# Patient Record
Sex: FEMALE | Race: WHITE | NOT HISPANIC OR LATINO
[De-identification: names, ages, dates, MRNs, and addresses within clinical notes are randomized per-mention and may not be internally consistent; named-entity substitution may affect disease eponyms.]

---

## 2017-02-03 ENCOUNTER — LABORATORY RESULT (OUTPATIENT)
Age: 62
End: 2017-02-03

## 2017-02-03 ENCOUNTER — APPOINTMENT (OUTPATIENT)
Dept: INTERNAL MEDICINE | Facility: CLINIC | Age: 62
End: 2017-02-03

## 2017-02-03 VITALS
BODY MASS INDEX: 29.03 KG/M2 | TEMPERATURE: 97.4 F | SYSTOLIC BLOOD PRESSURE: 130 MMHG | RESPIRATION RATE: 14 BRPM | HEIGHT: 67 IN | OXYGEN SATURATION: 98 % | WEIGHT: 185 LBS | DIASTOLIC BLOOD PRESSURE: 70 MMHG | HEART RATE: 84 BPM

## 2017-02-03 DIAGNOSIS — K29.70 GASTRITIS, UNSPECIFIED, W/OUT BLEEDING: ICD-10-CM

## 2017-02-06 LAB
ALBUMIN SERPL ELPH-MCNC: 4 G/DL
ALP BLD-CCNC: 67 U/L
ALT SERPL-CCNC: 15 U/L
ANION GAP SERPL CALC-SCNC: 19 MMOL/L
AST SERPL-CCNC: 18 U/L
BASOPHILS # BLD AUTO: 0.01 K/UL
BASOPHILS NFR BLD AUTO: 0.1 %
BILIRUB SERPL-MCNC: 0.2 MG/DL
BUN SERPL-MCNC: 16 MG/DL
CALCIUM SERPL-MCNC: 9.5 MG/DL
CHLORIDE SERPL-SCNC: 100 MMOL/L
CO2 SERPL-SCNC: 25 MMOL/L
CREAT SERPL-MCNC: 0.82 MG/DL
CRP SERPL-MCNC: 1.56 MG/DL
EOSINOPHIL # BLD AUTO: 0.13 K/UL
EOSINOPHIL NFR BLD AUTO: 1.7 %
FERRITIN SERPL-MCNC: 76.2 NG/ML
GLUCOSE SERPL-MCNC: 95 MG/DL
HCT VFR BLD CALC: 39.6 %
HGB BLD-MCNC: 12.7 G/DL
IMM GRANULOCYTES NFR BLD AUTO: 0.4 %
IRON SATN MFR SERPL: 22 %
IRON SERPL-MCNC: 74 UG/DL
LYMPHOCYTES # BLD AUTO: 2.58 K/UL
LYMPHOCYTES NFR BLD AUTO: 33.2 %
MAN DIFF?: NORMAL
MCHC RBC-ENTMCNC: 27 PG
MCHC RBC-ENTMCNC: 32.1 GM/DL
MCV RBC AUTO: 84.1 FL
MONOCYTES # BLD AUTO: 0.64 K/UL
MONOCYTES NFR BLD AUTO: 8.2 %
NEUTROPHILS # BLD AUTO: 4.39 K/UL
NEUTROPHILS NFR BLD AUTO: 56.4 %
PLATELET # BLD AUTO: 248 K/UL
POTASSIUM SERPL-SCNC: 4.3 MMOL/L
PROT SERPL-MCNC: 7.1 G/DL
RBC # BLD: 4.71 M/UL
RBC # FLD: 13.7 %
SODIUM SERPL-SCNC: 144 MMOL/L
TIBC SERPL-MCNC: 341 UG/DL
UIBC SERPL-MCNC: 267 UG/DL
WBC # FLD AUTO: 7.78 K/UL

## 2017-02-07 ENCOUNTER — RESULT REVIEW (OUTPATIENT)
Age: 62
End: 2017-02-07

## 2017-02-07 ENCOUNTER — APPOINTMENT (OUTPATIENT)
Dept: SURGERY | Facility: CLINIC | Age: 62
End: 2017-02-07

## 2017-02-07 ENCOUNTER — INPATIENT (INPATIENT)
Facility: HOSPITAL | Age: 62
LOS: 0 days | Discharge: HOME | DRG: 354 | End: 2017-02-08
Attending: EMERGENCY MEDICINE | Admitting: EMERGENCY MEDICINE
Payer: COMMERCIAL

## 2017-02-07 ENCOUNTER — TRANSCRIPTION ENCOUNTER (OUTPATIENT)
Age: 62
End: 2017-02-07

## 2017-02-07 VITALS
WEIGHT: 185 LBS | SYSTOLIC BLOOD PRESSURE: 128 MMHG | HEIGHT: 67 IN | BODY MASS INDEX: 29.03 KG/M2 | DIASTOLIC BLOOD PRESSURE: 74 MMHG

## 2017-02-07 VITALS
DIASTOLIC BLOOD PRESSURE: 80 MMHG | TEMPERATURE: 98 F | HEIGHT: 67 IN | HEART RATE: 74 BPM | SYSTOLIC BLOOD PRESSURE: 135 MMHG | WEIGHT: 169.98 LBS | RESPIRATION RATE: 14 BRPM | OXYGEN SATURATION: 100 %

## 2017-02-07 DIAGNOSIS — M79.89 OTHER SPECIFIED SOFT TISSUE DISORDERS: ICD-10-CM

## 2017-02-07 DIAGNOSIS — K43.9 VENTRAL HERNIA WITHOUT OBSTRUCTION OR GANGRENE: ICD-10-CM

## 2017-02-07 DIAGNOSIS — Z98.89 OTHER SPECIFIED POSTPROCEDURAL STATES: Chronic | ICD-10-CM

## 2017-02-07 DIAGNOSIS — L03.311 CELLULITIS OF ABDOMINAL WALL: ICD-10-CM

## 2017-02-07 DIAGNOSIS — R10.13 EPIGASTRIC PAIN: ICD-10-CM

## 2017-02-07 LAB
ALBUMIN SERPL ELPH-MCNC: 3.4 G/DL — SIGNIFICANT CHANGE UP (ref 3.3–5)
ALP SERPL-CCNC: 72 U/L — SIGNIFICANT CHANGE UP (ref 40–120)
ALT FLD-CCNC: 23 U/L — SIGNIFICANT CHANGE UP (ref 12–78)
AMYLASE P1 CFR SERPL: 56 U/L — SIGNIFICANT CHANGE UP (ref 25–115)
ANION GAP SERPL CALC-SCNC: 12 MMOL/L — SIGNIFICANT CHANGE UP (ref 5–17)
APPEARANCE UR: CLEAR — SIGNIFICANT CHANGE UP
APTT BLD: 26.2 SEC — LOW (ref 27.5–37.4)
AST SERPL-CCNC: 20 U/L — SIGNIFICANT CHANGE UP (ref 15–37)
BASOPHILS # BLD AUTO: 0.1 K/UL — SIGNIFICANT CHANGE UP (ref 0–0.2)
BASOPHILS NFR BLD AUTO: 0.9 % — SIGNIFICANT CHANGE UP (ref 0–2)
BILIRUB SERPL-MCNC: 0.3 MG/DL — SIGNIFICANT CHANGE UP (ref 0.2–1.2)
BILIRUB UR-MCNC: ABNORMAL
BUN SERPL-MCNC: 18 MG/DL — SIGNIFICANT CHANGE UP (ref 7–23)
CALCIUM SERPL-MCNC: 8.8 MG/DL — SIGNIFICANT CHANGE UP (ref 8.5–10.1)
CHLORIDE SERPL-SCNC: 103 MMOL/L — SIGNIFICANT CHANGE UP (ref 96–108)
CO2 SERPL-SCNC: 25 MMOL/L — SIGNIFICANT CHANGE UP (ref 22–31)
COLOR SPEC: YELLOW — SIGNIFICANT CHANGE UP
CREAT SERPL-MCNC: 0.89 MG/DL — SIGNIFICANT CHANGE UP (ref 0.5–1.3)
DIFF PNL FLD: ABNORMAL
EOSINOPHIL # BLD AUTO: 0.1 K/UL — SIGNIFICANT CHANGE UP (ref 0–0.5)
EOSINOPHIL NFR BLD AUTO: 1.3 % — SIGNIFICANT CHANGE UP (ref 0–6)
GLUCOSE SERPL-MCNC: 133 MG/DL — HIGH (ref 70–99)
GLUCOSE UR QL: NEGATIVE — SIGNIFICANT CHANGE UP
HCT VFR BLD CALC: 38 % — SIGNIFICANT CHANGE UP (ref 34.5–45)
HGB BLD-MCNC: 12.4 G/DL — SIGNIFICANT CHANGE UP (ref 11.5–15.5)
INR BLD: 1.04 RATIO — SIGNIFICANT CHANGE UP (ref 0.88–1.16)
KETONES UR-MCNC: ABNORMAL
LACTATE SERPL-SCNC: 2.7 MMOL/L — HIGH (ref 0.7–2)
LEUKOCYTE ESTERASE UR-ACNC: ABNORMAL
LIDOCAIN IGE QN: 207 U/L — SIGNIFICANT CHANGE UP (ref 73–393)
LYMPHOCYTES # BLD AUTO: 29 % — SIGNIFICANT CHANGE UP (ref 13–44)
LYMPHOCYTES # BLD AUTO: 3.1 K/UL — SIGNIFICANT CHANGE UP (ref 1–3.3)
MCHC RBC-ENTMCNC: 27.1 PG — SIGNIFICANT CHANGE UP (ref 27–34)
MCHC RBC-ENTMCNC: 32.7 GM/DL — SIGNIFICANT CHANGE UP (ref 32–36)
MCV RBC AUTO: 83 FL — SIGNIFICANT CHANGE UP (ref 80–100)
MONOCYTES # BLD AUTO: 0.5 K/UL — SIGNIFICANT CHANGE UP (ref 0–0.9)
MONOCYTES NFR BLD AUTO: 5 % — SIGNIFICANT CHANGE UP (ref 1–9)
NEUTROPHILS # BLD AUTO: 6.7 K/UL — SIGNIFICANT CHANGE UP (ref 1.8–7.4)
NEUTROPHILS NFR BLD AUTO: 63.8 % — SIGNIFICANT CHANGE UP (ref 43–77)
NITRITE UR-MCNC: NEGATIVE — SIGNIFICANT CHANGE UP
PH UR: 5 — SIGNIFICANT CHANGE UP (ref 4.8–8)
PLATELET # BLD AUTO: 257 K/UL — SIGNIFICANT CHANGE UP (ref 150–400)
POTASSIUM SERPL-MCNC: 4.1 MMOL/L — SIGNIFICANT CHANGE UP (ref 3.5–5.3)
POTASSIUM SERPL-SCNC: 4.1 MMOL/L — SIGNIFICANT CHANGE UP (ref 3.5–5.3)
PROT SERPL-MCNC: 7.7 G/DL — SIGNIFICANT CHANGE UP (ref 6–8.3)
PROT UR-MCNC: 25 MG/DL
PROTHROM AB SERPL-ACNC: 11.5 SEC — SIGNIFICANT CHANGE UP (ref 10–13.1)
RBC # BLD: 4.58 M/UL — SIGNIFICANT CHANGE UP (ref 3.8–5.2)
RBC # FLD: 12.3 % — SIGNIFICANT CHANGE UP (ref 10.3–14.5)
SODIUM SERPL-SCNC: 140 MMOL/L — SIGNIFICANT CHANGE UP (ref 135–145)
SP GR SPEC: 1.02 — SIGNIFICANT CHANGE UP (ref 1.01–1.02)
UROBILINOGEN FLD QL: NEGATIVE — SIGNIFICANT CHANGE UP
WBC # BLD: 10.6 K/UL — HIGH (ref 3.8–10.5)
WBC # FLD AUTO: 10.6 K/UL — HIGH (ref 3.8–10.5)

## 2017-02-07 PROCEDURE — 99285 EMERGENCY DEPT VISIT HI MDM: CPT

## 2017-02-07 PROCEDURE — 74177 CT ABD & PELVIS W/CONTRAST: CPT | Mod: 26

## 2017-02-07 PROCEDURE — 99223 1ST HOSP IP/OBS HIGH 75: CPT | Mod: 57

## 2017-02-07 PROCEDURE — 99222 1ST HOSP IP/OBS MODERATE 55: CPT | Mod: AI,GC

## 2017-02-07 RX ORDER — SODIUM CHLORIDE 9 MG/ML
3 INJECTION INTRAMUSCULAR; INTRAVENOUS; SUBCUTANEOUS ONCE
Qty: 0 | Refills: 0 | Status: COMPLETED | OUTPATIENT
Start: 2017-02-07 | End: 2017-02-07

## 2017-02-07 RX ORDER — TAMOXIFEN CITRATE 20 MG/1
20 TABLET, FILM COATED ORAL DAILY
Qty: 0 | Refills: 0 | Status: DISCONTINUED | OUTPATIENT
Start: 2017-02-07 | End: 2017-02-08

## 2017-02-07 RX ORDER — IOHEXOL 300 MG/ML
30 INJECTION, SOLUTION INTRAVENOUS ONCE
Qty: 0 | Refills: 0 | Status: COMPLETED | OUTPATIENT
Start: 2017-02-07 | End: 2017-02-07

## 2017-02-07 RX ORDER — CEFAZOLIN SODIUM 1 G
2000 VIAL (EA) INJECTION ONCE
Qty: 0 | Refills: 0 | Status: DISCONTINUED | OUTPATIENT
Start: 2017-02-07 | End: 2017-02-08

## 2017-02-07 RX ORDER — SODIUM CHLORIDE 9 MG/ML
1000 INJECTION INTRAMUSCULAR; INTRAVENOUS; SUBCUTANEOUS ONCE
Qty: 0 | Refills: 0 | Status: DISCONTINUED | OUTPATIENT
Start: 2017-02-07 | End: 2017-02-08

## 2017-02-07 RX ORDER — SODIUM CHLORIDE 9 MG/ML
1000 INJECTION INTRAMUSCULAR; INTRAVENOUS; SUBCUTANEOUS ONCE
Qty: 0 | Refills: 0 | Status: COMPLETED | OUTPATIENT
Start: 2017-02-07 | End: 2017-02-07

## 2017-02-07 RX ORDER — CEFAZOLIN SODIUM 1 G
1000 VIAL (EA) INJECTION ONCE
Qty: 0 | Refills: 0 | Status: COMPLETED | OUTPATIENT
Start: 2017-02-07 | End: 2017-02-07

## 2017-02-07 RX ADMIN — SODIUM CHLORIDE 3 MILLILITER(S): 9 INJECTION INTRAMUSCULAR; INTRAVENOUS; SUBCUTANEOUS at 17:59

## 2017-02-07 RX ADMIN — IOHEXOL 30 MILLILITER(S): 300 INJECTION, SOLUTION INTRAVENOUS at 17:56

## 2017-02-07 RX ADMIN — Medication 100 MILLIGRAM(S): at 21:27

## 2017-02-07 RX ADMIN — SODIUM CHLORIDE 1000 MILLILITER(S): 9 INJECTION INTRAMUSCULAR; INTRAVENOUS; SUBCUTANEOUS at 17:55

## 2017-02-07 NOTE — ED PROVIDER NOTE - CHPI ED SYMPTOMS NEG
no diarrhea/no fever/no burning urination/no dysuria/no hematuria/no blood in stool/no vomiting/no nausea/no chills

## 2017-02-07 NOTE — H&P ADULT. - ASSESSMENT
61 year old woman with incarcerated ventral hernia. 60 yo F with PMH of Breast Cancer s/p radiation therapy x 8 weeks, R lumpectomy in 2014 admitted with Incarcerated Ventral Hernia.

## 2017-02-07 NOTE — H&P ADULT. - PMH
Breast cancer    Ventral hernia without obstruction or gangrene Breast cancer  s/p 8 weeks radiation therapy, s/p R lumpectomy in 2014  Ventral hernia without obstruction or gangrene

## 2017-02-07 NOTE — ED PROVIDER NOTE - CONDUCTED A DETAILED DISCUSSION WITH PATIENT AND/OR GUARDIAN REGARDING, MDM
radiology results/lab results/return to ED if symptoms worsen, persist or questions arise/need to admit

## 2017-02-07 NOTE — H&P ADULT. - OTHER CARE PROVIDERS
Joshua (OB/GYN), Maria Fernanda (Breast Surgery), Aubrey (Plastic Surgery) Joshua (OB/GYN), Maria Fernanda (Breast Surgery), Aubrey (Plastic Surgery), Monica (Surgery)

## 2017-02-07 NOTE — ED PROVIDER NOTE - ABDOMINAL EXAM
soft/tender.../HERNIATION/herniation noted to right mid upper abdominal region TTP hard with overlying erythema noted

## 2017-02-07 NOTE — ED PROVIDER NOTE - MEDICAL DECISION MAKING DETAILS
cbc, cmp, amylase, lipase, lactate, blood culture, UA, urine culture, INR, type and screen, CT abd/pelvis

## 2017-02-07 NOTE — H&P ADULT. - HISTORY OF PRESENT ILLNESS
60 yo F with PMH of Breast Cancer s/p radiation therapy x 8 weeks, R lumpectomy in 2014, who was in her usual state of good health, presented to the ED with abdominal pain for the past 1.5 weeks. States that pain is new onset, and first felt the pain after pulling a heavy rug (though she is not sure that was the cause). Since then, 60 yo F with PMH of Breast Cancer s/p radiation therapy x 8 weeks, R lumpectomy in 2014, who was in her usual state of good health, presented to the ED via Dr Anderson's office with history of abdominal pain for the past 1.5 weeks. States that pain is new onset, and first felt the pain after pulling a heavy rug (though she is not sure that was the cause).   She was seen by PMD and sent for surgical evaluation to r/o hernia.  Pt seen by Dr Anderson who referred to ED as the patient presented with erythematous mass in mid epigastrium, painful and fluctuant.  CT abdomen recommended to r/o Incarcerated ventral hernia vs Abscess. 60 yo F with PMH of Breast Cancer s/p radiation therapy x 8 weeks, R lumpectomy in 2014, who was in her usual state of good health, presented to the ED via Dr Anderson's office with history of abdominal pain for the past 1.5 weeks. States that pain is new onset, and first felt the pain after pulling a heavy rug (though she is not sure that was the cause). Also noted a bulge in her upper to mid abdomen, midline region, at the site of the pain, as well as overlying redness at the site. Pain is exacerbated by any touch or pressure, causing 10/10 pain that is sharp, aching, and non radiating. No relieving factors. Denies fever, chills, n/v/d/c, chest pain, palpitations, SOB cough, increased frequency, urgency, or pain with urination. Pt was seen by PMD and sent for surgical evaluation to r/o hernia.  Pt seen by Dr Anderson who referred her to ED as the patient presented with erythematous mass in mid epigastrium, painful and fluctuant. CT abdomen recommended to r/o Incarcerated ventral hernia vs Abscess.    In ED:  VS: Hemodynamically stable.  Labs: CBC showed elevated WBC at 10.6. Lactate elevated at 2.7.   UA: trace LE, Nitrite negative, 25 protein, small bilirubin, small ketone, small blood, 3-5 RBCs, few bacteria, 3-5 Hyaline casts.  Imaging: CT Abdomen and Pelvis w/ Oral and IV Contrast: Upper abdominal wall ventral hernia contains inflamed omental fat and   fluid, suggestive of strangulation with fat necrosis. No bowel is involved. Negative for obstruction.  Tx: Cefazolin 1 gram IV x1, NS 1L Bolus x2.  Dispo: Admitted to [facility] for Incarcerated Ventral Hernia.

## 2017-02-07 NOTE — ED ADULT NURSE NOTE - OBJECTIVE STATEMENT
61 year old female complains of abdominal pain and "hernia". Patient reports an enlarged bulge in her upper abdominal area for the past week and a half. Patient reports pain to the region only when touched or with pressure. Patient has a history of breast cancer and is currently taking PO chemo. Pt was seen by surgeon today and sent to the ED for abdominal workup.

## 2017-02-07 NOTE — ED PROVIDER NOTE - CARE PLAN
Principal Discharge DX:	Ventral hernia  Instructions for follow-up, activity and diet:	admit  Secondary Diagnosis:	Fat necrosis  Secondary Diagnosis:	Cellulitis

## 2017-02-07 NOTE — H&P ADULT. - LAB RESULTS AND INTERPRETATION
CBC showed elevated WBC at 10.6. Lactate elevated at 2.7.  UA: trace LE, Nitrite negative, 25 protein, small bilirubin, small ketone, small blood, 3-5 RBCs, few bacteria, 3-5 Hyaline casts.

## 2017-02-07 NOTE — ED ADULT NURSE REASSESSMENT NOTE - NS ED NURSE REASSESS COMMENT FT1
Patient received from Rehana CASTAÑEDA. Patient is A&Ox4 , laying on the stretcher calmly , denies any pain or discomfort. Patient went to radiology. Will continue to monitor.
CT oral contrast administered to patient. Patient prepping at this time, instructed to drink over 1 hour before CT planned in 2 hours. Will continue to monitor. Safety maintained.

## 2017-02-07 NOTE — ED PROVIDER NOTE - OBJECTIVE STATEMENT
Pt is a 60 yo female who presents to the ED with a cc of abdominal pain.  Pt reports that approx 1 1/2 weeks ago she noted a bulge in her upper abdominal region. Pt reports that it appears to have enlarged to her.   She also reports overlying erythema to the site for the last several days.  Denies previous abdominal surgeries.  Does report that just prior to noting the bulge in the abdomen she had lifted a heavy rug.  Denies pain to the site at rest but reports that when it is touched or any pressure is placed on the site she experiences extreme pain.  She also reports that for the last 3 days she has experienced constipation.  She did have a loose BM last night and again this morning.  Denies fever, chills, N/V/D, CP, SOB, ext numbness or weakness.  Pt does have a history of breast cancer and is currently on oral therapy. Pt was seen in the surgeon's office today and sent to the ED for further work up

## 2017-02-07 NOTE — H&P ADULT. - RADIOLOGY RESULTS AND INTERPRETATION
Imaging: CT Abdomen and Pelvis w/ Oral and IV Contrast: Upper abdominal wall ventral hernia contains inflamed omental fat and   fluid, suggestive of strangulation with fat necrosis. No bowel is involved. Negative for obstruction.

## 2017-02-07 NOTE — ED PROVIDER NOTE - FAMILY HISTORY
Mother  Still living? Unknown  Family history of breast cancer in mother, Age at diagnosis: Age Unknown

## 2017-02-08 ENCOUNTER — TRANSCRIPTION ENCOUNTER (OUTPATIENT)
Age: 62
End: 2017-02-08

## 2017-02-08 VITALS
SYSTOLIC BLOOD PRESSURE: 100 MMHG | OXYGEN SATURATION: 96 % | HEART RATE: 96 BPM | TEMPERATURE: 97 F | DIASTOLIC BLOOD PRESSURE: 68 MMHG | RESPIRATION RATE: 16 BRPM

## 2017-02-08 LAB
ANION GAP SERPL CALC-SCNC: 9 MMOL/L — SIGNIFICANT CHANGE UP (ref 5–17)
BASOPHILS # BLD AUTO: 0 K/UL — SIGNIFICANT CHANGE UP (ref 0–0.2)
BASOPHILS NFR BLD AUTO: 0.2 % — SIGNIFICANT CHANGE UP (ref 0–2)
BUN SERPL-MCNC: 14 MG/DL — SIGNIFICANT CHANGE UP (ref 7–23)
CALCIUM SERPL-MCNC: 8.3 MG/DL — LOW (ref 8.5–10.1)
CHLORIDE SERPL-SCNC: 106 MMOL/L — SIGNIFICANT CHANGE UP (ref 96–108)
CO2 SERPL-SCNC: 26 MMOL/L — SIGNIFICANT CHANGE UP (ref 22–31)
CREAT SERPL-MCNC: 0.7 MG/DL — SIGNIFICANT CHANGE UP (ref 0.5–1.3)
CULTURE RESULTS: SIGNIFICANT CHANGE UP
EOSINOPHIL # BLD AUTO: 0 K/UL — SIGNIFICANT CHANGE UP (ref 0–0.5)
EOSINOPHIL NFR BLD AUTO: 0.1 % — SIGNIFICANT CHANGE UP (ref 0–6)
GLUCOSE SERPL-MCNC: 141 MG/DL — HIGH (ref 70–99)
HCT VFR BLD CALC: 35.4 % — SIGNIFICANT CHANGE UP (ref 34.5–45)
HGB BLD-MCNC: 11.6 G/DL — SIGNIFICANT CHANGE UP (ref 11.5–15.5)
LACTATE SERPL-SCNC: 1 MMOL/L — SIGNIFICANT CHANGE UP (ref 0.7–2)
LYMPHOCYTES # BLD AUTO: 1 K/UL — SIGNIFICANT CHANGE UP (ref 1–3.3)
LYMPHOCYTES # BLD AUTO: 10.1 % — LOW (ref 13–44)
MCHC RBC-ENTMCNC: 27.2 PG — SIGNIFICANT CHANGE UP (ref 27–34)
MCHC RBC-ENTMCNC: 32.9 GM/DL — SIGNIFICANT CHANGE UP (ref 32–36)
MCV RBC AUTO: 82.9 FL — SIGNIFICANT CHANGE UP (ref 80–100)
MONOCYTES # BLD AUTO: 0.2 K/UL — SIGNIFICANT CHANGE UP (ref 0–0.9)
MONOCYTES NFR BLD AUTO: 1.5 % — SIGNIFICANT CHANGE UP (ref 1–9)
NEUTROPHILS # BLD AUTO: 8.9 K/UL — HIGH (ref 1.8–7.4)
NEUTROPHILS NFR BLD AUTO: 88.1 % — HIGH (ref 43–77)
PLATELET # BLD AUTO: 231 K/UL — SIGNIFICANT CHANGE UP (ref 150–400)
POTASSIUM SERPL-MCNC: 4.6 MMOL/L — SIGNIFICANT CHANGE UP (ref 3.5–5.3)
POTASSIUM SERPL-SCNC: 4.6 MMOL/L — SIGNIFICANT CHANGE UP (ref 3.5–5.3)
RBC # BLD: 4.27 M/UL — SIGNIFICANT CHANGE UP (ref 3.8–5.2)
RBC # FLD: 12.6 % — SIGNIFICANT CHANGE UP (ref 10.3–14.5)
SODIUM SERPL-SCNC: 141 MMOL/L — SIGNIFICANT CHANGE UP (ref 135–145)
SPECIMEN SOURCE: SIGNIFICANT CHANGE UP
WBC # BLD: 10.1 K/UL — SIGNIFICANT CHANGE UP (ref 3.8–10.5)
WBC # FLD AUTO: 10.1 K/UL — SIGNIFICANT CHANGE UP (ref 3.8–10.5)

## 2017-02-08 PROCEDURE — 82150 ASSAY OF AMYLASE: CPT

## 2017-02-08 PROCEDURE — 85730 THROMBOPLASTIN TIME PARTIAL: CPT

## 2017-02-08 PROCEDURE — 96365 THER/PROPH/DIAG IV INF INIT: CPT

## 2017-02-08 PROCEDURE — 85610 PROTHROMBIN TIME: CPT

## 2017-02-08 PROCEDURE — 86900 BLOOD TYPING SEROLOGIC ABO: CPT

## 2017-02-08 PROCEDURE — 96375 TX/PRO/DX INJ NEW DRUG ADDON: CPT

## 2017-02-08 PROCEDURE — 88302 TISSUE EXAM BY PATHOLOGIST: CPT

## 2017-02-08 PROCEDURE — 83605 ASSAY OF LACTIC ACID: CPT

## 2017-02-08 PROCEDURE — 83690 ASSAY OF LIPASE: CPT

## 2017-02-08 PROCEDURE — 99285 EMERGENCY DEPT VISIT HI MDM: CPT | Mod: 25

## 2017-02-08 PROCEDURE — 49561: CPT

## 2017-02-08 PROCEDURE — 86850 RBC ANTIBODY SCREEN: CPT

## 2017-02-08 PROCEDURE — 99239 HOSP IP/OBS DSCHRG MGMT >30: CPT

## 2017-02-08 PROCEDURE — 80053 COMPREHEN METABOLIC PANEL: CPT

## 2017-02-08 PROCEDURE — 87086 URINE CULTURE/COLONY COUNT: CPT

## 2017-02-08 PROCEDURE — 74177 CT ABD & PELVIS W/CONTRAST: CPT

## 2017-02-08 PROCEDURE — 49561: CPT | Mod: AS

## 2017-02-08 PROCEDURE — 81001 URINALYSIS AUTO W/SCOPE: CPT

## 2017-02-08 PROCEDURE — C1781: CPT

## 2017-02-08 PROCEDURE — 86901 BLOOD TYPING SEROLOGIC RH(D): CPT

## 2017-02-08 PROCEDURE — 88302 TISSUE EXAM BY PATHOLOGIST: CPT | Mod: 26

## 2017-02-08 PROCEDURE — 85027 COMPLETE CBC AUTOMATED: CPT

## 2017-02-08 PROCEDURE — 80048 BASIC METABOLIC PNL TOTAL CA: CPT

## 2017-02-08 RX ORDER — HYDROMORPHONE HYDROCHLORIDE 2 MG/ML
0.5 INJECTION INTRAMUSCULAR; INTRAVENOUS; SUBCUTANEOUS
Qty: 0 | Refills: 0 | Status: DISCONTINUED | OUTPATIENT
Start: 2017-02-08 | End: 2017-02-08

## 2017-02-08 RX ORDER — SODIUM CHLORIDE 9 MG/ML
1000 INJECTION, SOLUTION INTRAVENOUS
Qty: 0 | Refills: 0 | Status: DISCONTINUED | OUTPATIENT
Start: 2017-02-08 | End: 2017-02-08

## 2017-02-08 RX ORDER — ONDANSETRON 8 MG/1
4 TABLET, FILM COATED ORAL ONCE
Qty: 0 | Refills: 0 | Status: DISCONTINUED | OUTPATIENT
Start: 2017-02-08 | End: 2017-02-08

## 2017-02-08 RX ORDER — TAMOXIFEN CITRATE 20 MG/1
1 TABLET, FILM COATED ORAL
Qty: 0 | Refills: 0 | COMMUNITY
Start: 2017-02-08

## 2017-02-08 RX ORDER — ENOXAPARIN SODIUM 100 MG/ML
40 INJECTION SUBCUTANEOUS EVERY 24 HOURS
Qty: 0 | Refills: 0 | Status: DISCONTINUED | OUTPATIENT
Start: 2017-02-09 | End: 2017-02-08

## 2017-02-08 RX ORDER — OXYCODONE HYDROCHLORIDE 5 MG/1
1 TABLET ORAL
Qty: 28 | Refills: 0 | OUTPATIENT
Start: 2017-02-08 | End: 2017-02-15

## 2017-02-08 RX ORDER — KETOROLAC TROMETHAMINE 30 MG/ML
30 SYRINGE (ML) INJECTION ONCE
Qty: 0 | Refills: 0 | Status: DISCONTINUED | OUTPATIENT
Start: 2017-02-08 | End: 2017-02-08

## 2017-02-08 RX ORDER — TAMOXIFEN CITRATE 20 MG/1
1 TABLET, FILM COATED ORAL
Qty: 0 | Refills: 0 | DISCHARGE
Start: 2017-02-08

## 2017-02-08 RX ORDER — CEPHALEXIN 500 MG
1 CAPSULE ORAL
Qty: 28 | Refills: 0 | OUTPATIENT
Start: 2017-02-08 | End: 2017-02-15

## 2017-02-08 RX ORDER — METOPROLOL TARTRATE 50 MG
2 TABLET ORAL
Qty: 0 | Refills: 0 | Status: DISCONTINUED | OUTPATIENT
Start: 2017-02-08 | End: 2017-02-08

## 2017-02-08 RX ORDER — TAMOXIFEN CITRATE 20 MG/1
20 TABLET, FILM COATED ORAL DAILY
Qty: 0 | Refills: 0 | Status: DISCONTINUED | OUTPATIENT
Start: 2017-02-08 | End: 2017-02-08

## 2017-02-08 RX ORDER — CEFAZOLIN SODIUM 1 G
1000 VIAL (EA) INJECTION EVERY 8 HOURS
Qty: 0 | Refills: 0 | Status: DISCONTINUED | OUTPATIENT
Start: 2017-02-08 | End: 2017-02-08

## 2017-02-08 RX ORDER — HYDROMORPHONE HYDROCHLORIDE 2 MG/ML
1 INJECTION INTRAMUSCULAR; INTRAVENOUS; SUBCUTANEOUS EVERY 4 HOURS
Qty: 0 | Refills: 0 | Status: DISCONTINUED | OUTPATIENT
Start: 2017-02-08 | End: 2017-02-08

## 2017-02-08 RX ADMIN — Medication 30 MILLILITER(S): at 13:19

## 2017-02-08 RX ADMIN — Medication 30 MILLIGRAM(S): at 02:01

## 2017-02-08 RX ADMIN — Medication 100 MILLIGRAM(S): at 08:27

## 2017-02-08 RX ADMIN — HYDROMORPHONE HYDROCHLORIDE 0.5 MILLIGRAM(S): 2 INJECTION INTRAMUSCULAR; INTRAVENOUS; SUBCUTANEOUS at 02:22

## 2017-02-08 RX ADMIN — HYDROMORPHONE HYDROCHLORIDE 0.5 MILLIGRAM(S): 2 INJECTION INTRAMUSCULAR; INTRAVENOUS; SUBCUTANEOUS at 01:56

## 2017-02-08 RX ADMIN — HYDROMORPHONE HYDROCHLORIDE 0.5 MILLIGRAM(S): 2 INJECTION INTRAMUSCULAR; INTRAVENOUS; SUBCUTANEOUS at 02:26

## 2017-02-08 RX ADMIN — HYDROMORPHONE HYDROCHLORIDE 0.5 MILLIGRAM(S): 2 INJECTION INTRAMUSCULAR; INTRAVENOUS; SUBCUTANEOUS at 01:59

## 2017-02-08 RX ADMIN — HYDROMORPHONE HYDROCHLORIDE 0.5 MILLIGRAM(S): 2 INJECTION INTRAMUSCULAR; INTRAVENOUS; SUBCUTANEOUS at 02:11

## 2017-02-08 RX ADMIN — HYDROMORPHONE HYDROCHLORIDE 0.5 MILLIGRAM(S): 2 INJECTION INTRAMUSCULAR; INTRAVENOUS; SUBCUTANEOUS at 01:46

## 2017-02-08 RX ADMIN — HYDROMORPHONE HYDROCHLORIDE 0.5 MILLIGRAM(S): 2 INJECTION INTRAMUSCULAR; INTRAVENOUS; SUBCUTANEOUS at 02:14

## 2017-02-08 RX ADMIN — Medication 30 MILLIGRAM(S): at 02:16

## 2017-02-08 RX ADMIN — SODIUM CHLORIDE 50 MILLILITER(S): 9 INJECTION, SOLUTION INTRAVENOUS at 01:59

## 2017-02-08 RX ADMIN — SODIUM CHLORIDE 50 MILLILITER(S): 9 INJECTION, SOLUTION INTRAVENOUS at 01:49

## 2017-02-08 NOTE — DISCHARGE NOTE ADULT - HOSPITAL COURSE
60 yo F with PMH of Breast Cancer s/p radiation therapy x 8 weeks, R lumpectomy in 2014, who was in her usual state of good health, presented to the ED via Dr Anderson's office with history of abdominal pain for the past 1.5 weeks. States that pain is new onset, and first felt the pain after pulling a heavy rug (though she is not sure that was the cause). Also noted a bulge in her upper to mid abdomen, midline region, at the site of the pain, as well as overlying redness at the site. Pain is exacerbated by any touch or pressure, causing 10/10 pain that is sharp, aching, and non radiating. No relieving factors. Denies fever, chills, n/v/d/c, chest pain, palpitations, SOB cough, increased frequency, urgency, or pain with urination. Pt was seen by PMD and sent for surgical evaluation to r/o hernia.  Pt seen by Dr Anderson who referred her to ED as the patient presented with erythematous mass in mid epigastrium, painful and fluctuant. CT abdomen recommended to r/o Incarcerated ventral hernia vs Abscess .admitted abd wall cellulitis surrounding incarcerated ventral hernia  / went or for emergent surgery by dr anderson  / post op hosp course stable seen by surgery pa / pt can be dc home on po abx and pain meds   if tolerate diet with fu  appointment Tuesday with  dr cabrera office / pmd dr rutledge notified dc plan.

## 2017-02-08 NOTE — PATIENT PROFILE ADULT. - PMH
Breast cancer  s/p 8 weeks radiation therapy, s/p R lumpectomy in 2014  Ventral hernia without obstruction or gangrene

## 2017-02-08 NOTE — DISCHARGE NOTE ADULT - ADDITIONAL INSTRUCTIONS
fu surgery dr ramirez in 1wk next Tuesday . fu pmd dr rutledge in 1wk . compressive dressing bio occlusive for  48 hr  than can remove and keep sterile strip on and can shower .

## 2017-02-08 NOTE — DISCHARGE NOTE ADULT - MEDICATION SUMMARY - MEDICATIONS TO TAKE
I will START or STAY ON the medications listed below when I get home from the hospital:    acetaminophen-oxyCODONE 325 mg-5 mg oral tablet  -- 1 tab(s) by mouth every 6 hours, As Needed / post hernia repair MDD:4  -- Caution federal law prohibits the transfer of this drug to any person other  than the person for whom it was prescribed.  May cause drowsiness.  Alcohol may intensify this effect.  Use care when operating dangerous machinery.  This prescription cannot be refilled.  This product contains acetaminophen.  Do not use  with any other product containing acetaminophen to prevent possible liver damage.  Using more of this medication than prescribed may cause serious breathing problems.    -- Indication: For hernia repair    tamoxifen 20 mg oral tablet  -- 1 tab(s) by mouth once a day  -- Indication: For hx breast ca     cephalexin 500 mg oral tablet  -- 1 tab(s) by mouth 4 times a day  -- Finish all this medication unless otherwise directed by prescriber.    -- Indication: For Cellulitis/ abd wall

## 2017-02-08 NOTE — DISCHARGE NOTE ADULT - CARE PLAN
Principal Discharge DX:	Ventral hernia  Goal:	post hernia repair  Instructions for follow-up, activity and diet:	fu Tuesday to dr ramirez  for fu  Secondary Diagnosis:	Cellulitis of abdominal wall  Instructions for follow-up, activity and diet:	on po abx  Secondary Diagnosis:	Breast cancer  Instructions for follow-up, activity and diet:	cont meds

## 2017-02-08 NOTE — DISCHARGE NOTE ADULT - CARE PROVIDERS DIRECT ADDRESSES
[email or web address]

## 2017-02-08 NOTE — DISCHARGE NOTE ADULT - CARE PROVIDER_API CALL
Mainor Arnett), Internal Medicine  [address] [place] [address]  Phone: [phone]  Fax: [phone]    Ashwin Anderson), Surgery  [address] [place] [address]  Phone: [phone]  Fax: [phone]

## 2017-02-10 DIAGNOSIS — Z92.3 PERSONAL HISTORY OF IRRADIATION: ICD-10-CM

## 2017-02-10 DIAGNOSIS — L03.311 CELLULITIS OF ABDOMINAL WALL: ICD-10-CM

## 2017-02-10 DIAGNOSIS — Z92.21 PERSONAL HISTORY OF ANTINEOPLASTIC CHEMOTHERAPY: ICD-10-CM

## 2017-02-10 DIAGNOSIS — K43.6 OTHER AND UNSPECIFIED VENTRAL HERNIA WITH OBSTRUCTION, WITHOUT GANGRENE: ICD-10-CM

## 2017-02-10 DIAGNOSIS — Z28.21 IMMUNIZATION NOT CARRIED OUT BECAUSE OF PATIENT REFUSAL: ICD-10-CM

## 2017-02-10 DIAGNOSIS — M79.89 OTHER SPECIFIED SOFT TISSUE DISORDERS: ICD-10-CM

## 2017-02-10 DIAGNOSIS — K65.4 SCLEROSING MESENTERITIS: ICD-10-CM

## 2017-02-10 DIAGNOSIS — K59.00 CONSTIPATION, UNSPECIFIED: ICD-10-CM

## 2017-02-10 DIAGNOSIS — R19.06 EPIGASTRIC SWELLING, MASS OR LUMP: ICD-10-CM

## 2017-02-10 DIAGNOSIS — C50.919 MALIGNANT NEOPLASM OF UNSPECIFIED SITE OF UNSPECIFIED FEMALE BREAST: ICD-10-CM

## 2017-02-14 ENCOUNTER — APPOINTMENT (OUTPATIENT)
Dept: SURGERY | Facility: CLINIC | Age: 62
End: 2017-02-14

## 2017-02-14 VITALS
DIASTOLIC BLOOD PRESSURE: 79 MMHG | BODY MASS INDEX: 29.03 KG/M2 | SYSTOLIC BLOOD PRESSURE: 117 MMHG | HEIGHT: 67 IN | WEIGHT: 185 LBS

## 2017-02-14 DIAGNOSIS — K46.0 UNSPECIFIED ABDOMINAL HERNIA WITH OBSTRUCTION, W/OUT GANGRENE: ICD-10-CM

## 2017-03-02 ENCOUNTER — APPOINTMENT (OUTPATIENT)
Dept: INTERNAL MEDICINE | Facility: CLINIC | Age: 62
End: 2017-03-02

## 2017-03-02 ENCOUNTER — RX RENEWAL (OUTPATIENT)
Age: 62
End: 2017-03-02

## 2017-03-02 VITALS
WEIGHT: 181 LBS | HEART RATE: 78 BPM | SYSTOLIC BLOOD PRESSURE: 118 MMHG | RESPIRATION RATE: 14 BRPM | BODY MASS INDEX: 28.41 KG/M2 | DIASTOLIC BLOOD PRESSURE: 68 MMHG | TEMPERATURE: 98 F | HEIGHT: 67 IN | OXYGEN SATURATION: 98 %

## 2017-03-29 ENCOUNTER — CHART COPY (OUTPATIENT)
Age: 62
End: 2017-03-29

## 2017-06-01 ENCOUNTER — APPOINTMENT (OUTPATIENT)
Dept: INTERNAL MEDICINE | Facility: CLINIC | Age: 62
End: 2017-06-01

## 2017-06-01 VITALS
SYSTOLIC BLOOD PRESSURE: 110 MMHG | DIASTOLIC BLOOD PRESSURE: 70 MMHG | BODY MASS INDEX: 27.94 KG/M2 | RESPIRATION RATE: 14 BRPM | OXYGEN SATURATION: 98 % | HEART RATE: 76 BPM | WEIGHT: 178 LBS | HEIGHT: 67 IN | TEMPERATURE: 98.2 F

## 2017-06-01 RX ORDER — AMOXICILLIN AND CLAVULANATE POTASSIUM 875; 125 MG/1; MG/1
875-125 TABLET, COATED ORAL
Qty: 20 | Refills: 0 | Status: DISCONTINUED | COMMUNITY
Start: 2017-01-11 | End: 2017-06-01

## 2017-06-01 RX ORDER — TOBRAMYCIN AND DEXAMETHASONE 3; 1 MG/ML; MG/ML
0.3-0.1 SUSPENSION/ DROPS OPHTHALMIC
Qty: 5 | Refills: 0 | Status: DISCONTINUED | COMMUNITY
Start: 2017-01-18 | End: 2017-06-01

## 2017-06-01 RX ORDER — AMOXICILLIN 875 MG/1
875 TABLET, FILM COATED ORAL
Qty: 20 | Refills: 0 | Status: DISCONTINUED | COMMUNITY
Start: 2017-01-26 | End: 2017-06-01

## 2017-06-01 RX ORDER — AZITHROMYCIN 250 MG/1
250 TABLET, FILM COATED ORAL
Qty: 1 | Refills: 0 | Status: DISCONTINUED | COMMUNITY
Start: 2017-02-03 | End: 2017-06-01

## 2017-06-01 RX ORDER — CEFADROXIL 500 MG/1
500 CAPSULE ORAL
Qty: 10 | Refills: 0 | Status: DISCONTINUED | COMMUNITY
Start: 2016-08-24 | End: 2017-06-01

## 2017-06-01 RX ORDER — OMEPRAZOLE 40 MG/1
40 CAPSULE, DELAYED RELEASE ORAL
Qty: 30 | Refills: 0 | Status: DISCONTINUED | COMMUNITY
Start: 2017-02-03 | End: 2017-06-01

## 2017-06-01 RX ORDER — BACITRACIN 500 [USP'U]/G
500 OINTMENT OPHTHALMIC
Qty: 4 | Refills: 0 | Status: DISCONTINUED | COMMUNITY
Start: 2017-01-17 | End: 2017-06-01

## 2017-06-01 RX ORDER — FLUTICASONE PROPIONATE 50 UG/1
50 SPRAY, METERED NASAL DAILY
Qty: 1 | Refills: 0 | Status: DISCONTINUED | COMMUNITY
Start: 2017-02-03 | End: 2017-06-01

## 2017-07-25 ENCOUNTER — APPOINTMENT (OUTPATIENT)
Dept: GYNECOLOGIC ONCOLOGY | Facility: CLINIC | Age: 62
End: 2017-07-25

## 2017-07-25 VITALS — WEIGHT: 173 LBS | HEIGHT: 67 IN | BODY MASS INDEX: 27.15 KG/M2

## 2017-07-25 DIAGNOSIS — N88.2 STRICTURE AND STENOSIS OF CERVIX UTERI: ICD-10-CM

## 2017-07-27 LAB — CORE LAB BIOPSY: NORMAL

## 2017-07-30 PROBLEM — N88.2 CERVICAL STENOSIS (UTERINE CERVIX): Status: ACTIVE | Noted: 2017-07-30

## 2017-08-14 ENCOUNTER — OTHER (OUTPATIENT)
Age: 62
End: 2017-08-14

## 2017-08-17 ENCOUNTER — APPOINTMENT (OUTPATIENT)
Dept: DERMATOLOGY | Facility: CLINIC | Age: 62
End: 2017-08-17

## 2017-08-23 ENCOUNTER — OUTPATIENT (OUTPATIENT)
Dept: OUTPATIENT SERVICES | Facility: HOSPITAL | Age: 62
LOS: 1 days | End: 2017-08-23
Payer: COMMERCIAL

## 2017-08-23 VITALS
WEIGHT: 166.89 LBS | SYSTOLIC BLOOD PRESSURE: 119 MMHG | TEMPERATURE: 98 F | RESPIRATION RATE: 12 BRPM | DIASTOLIC BLOOD PRESSURE: 79 MMHG | HEIGHT: 67.5 IN | HEART RATE: 77 BPM | OXYGEN SATURATION: 100 %

## 2017-08-23 DIAGNOSIS — R93.8 ABNORMAL FINDINGS ON DIAGNOSTIC IMAGING OF OTHER SPECIFIED BODY STRUCTURES: ICD-10-CM

## 2017-08-23 DIAGNOSIS — Z98.89 OTHER SPECIFIED POSTPROCEDURAL STATES: Chronic | ICD-10-CM

## 2017-08-23 DIAGNOSIS — N88.2 STRICTURE AND STENOSIS OF CERVIX UTERI: ICD-10-CM

## 2017-08-23 DIAGNOSIS — Z01.818 ENCOUNTER FOR OTHER PREPROCEDURAL EXAMINATION: ICD-10-CM

## 2017-08-23 PROCEDURE — 85027 COMPLETE CBC AUTOMATED: CPT

## 2017-08-23 PROCEDURE — G0463: CPT

## 2017-08-23 RX ORDER — ACETAMINOPHEN 500 MG
975 TABLET ORAL ONCE
Qty: 0 | Refills: 0 | Status: COMPLETED | OUTPATIENT
Start: 2017-08-30 | End: 2017-08-30

## 2017-08-23 RX ORDER — CELECOXIB 200 MG/1
200 CAPSULE ORAL ONCE
Qty: 0 | Refills: 0 | Status: COMPLETED | OUTPATIENT
Start: 2017-08-30 | End: 2017-08-30

## 2017-08-23 RX ORDER — LIDOCAINE HCL 20 MG/ML
0.2 VIAL (ML) INJECTION ONCE
Qty: 0 | Refills: 0 | Status: DISCONTINUED | OUTPATIENT
Start: 2017-08-30 | End: 2017-09-14

## 2017-08-23 RX ORDER — SODIUM CHLORIDE 9 MG/ML
3 INJECTION INTRAMUSCULAR; INTRAVENOUS; SUBCUTANEOUS EVERY 8 HOURS
Qty: 0 | Refills: 0 | Status: DISCONTINUED | OUTPATIENT
Start: 2017-08-30 | End: 2017-09-14

## 2017-08-23 NOTE — H&P PST ADULT - NSANTHOSAYNRD_GEN_A_CORE
No. MERARI screening performed.  STOP BANG Legend: 0-2 = LOW Risk; 3-4 = INTERMEDIATE Risk; 5-8 = HIGH Risk

## 2017-08-23 NOTE — H&P PST ADULT - ATTENDING COMMENTS
Seen in SDA. She reports no changes to her condition. Planned procedure discussed. Consent reviewed. All Q/A.

## 2017-08-24 ENCOUNTER — NON-APPOINTMENT (OUTPATIENT)
Age: 62
End: 2017-08-24

## 2017-08-24 ENCOUNTER — APPOINTMENT (OUTPATIENT)
Dept: INTERNAL MEDICINE | Facility: CLINIC | Age: 62
End: 2017-08-24
Payer: MEDICAID

## 2017-08-24 VITALS
DIASTOLIC BLOOD PRESSURE: 80 MMHG | OXYGEN SATURATION: 97 % | TEMPERATURE: 97.9 F | WEIGHT: 168 LBS | SYSTOLIC BLOOD PRESSURE: 130 MMHG | HEART RATE: 75 BPM | RESPIRATION RATE: 14 BRPM | HEIGHT: 67 IN | BODY MASS INDEX: 26.37 KG/M2

## 2017-08-24 PROCEDURE — 93000 ELECTROCARDIOGRAM COMPLETE: CPT

## 2017-08-24 PROCEDURE — 99214 OFFICE O/P EST MOD 30 MIN: CPT | Mod: 25

## 2017-08-30 ENCOUNTER — APPOINTMENT (OUTPATIENT)
Dept: GYNECOLOGIC ONCOLOGY | Facility: HOSPITAL | Age: 62
End: 2017-08-30

## 2017-08-30 ENCOUNTER — APPOINTMENT (OUTPATIENT)
Dept: ULTRASOUND IMAGING | Facility: HOSPITAL | Age: 62
End: 2017-08-30

## 2017-08-30 ENCOUNTER — RESULT REVIEW (OUTPATIENT)
Age: 62
End: 2017-08-30

## 2017-08-30 ENCOUNTER — OUTPATIENT (OUTPATIENT)
Dept: OUTPATIENT SERVICES | Facility: HOSPITAL | Age: 62
LOS: 1 days | End: 2017-08-30
Payer: COMMERCIAL

## 2017-08-30 VITALS — WEIGHT: 165.35 LBS | HEIGHT: 67 IN

## 2017-08-30 VITALS
OXYGEN SATURATION: 100 % | RESPIRATION RATE: 18 BRPM | HEART RATE: 94 BPM | DIASTOLIC BLOOD PRESSURE: 84 MMHG | SYSTOLIC BLOOD PRESSURE: 141 MMHG | TEMPERATURE: 98 F

## 2017-08-30 DIAGNOSIS — R93.8 ABNORMAL FINDINGS ON DIAGNOSTIC IMAGING OF OTHER SPECIFIED BODY STRUCTURES: ICD-10-CM

## 2017-08-30 DIAGNOSIS — Z98.89 OTHER SPECIFIED POSTPROCEDURAL STATES: Chronic | ICD-10-CM

## 2017-08-30 DIAGNOSIS — N88.2 STRICTURE AND STENOSIS OF CERVIX UTERI: ICD-10-CM

## 2017-08-30 PROCEDURE — 58558 HYSTEROSCOPY BIOPSY: CPT

## 2017-08-30 PROCEDURE — 88305 TISSUE EXAM BY PATHOLOGIST: CPT

## 2017-08-30 PROCEDURE — 76998 US GUIDE INTRAOP: CPT

## 2017-08-30 PROCEDURE — 88305 TISSUE EXAM BY PATHOLOGIST: CPT | Mod: 26

## 2017-08-30 RX ORDER — OXYCODONE HYDROCHLORIDE 5 MG/1
5 TABLET ORAL ONCE
Qty: 0 | Refills: 0 | Status: DISCONTINUED | OUTPATIENT
Start: 2017-08-30 | End: 2017-08-30

## 2017-08-30 RX ORDER — OXYCODONE HYDROCHLORIDE 5 MG/1
10 TABLET ORAL ONCE
Qty: 0 | Refills: 0 | Status: DISCONTINUED | OUTPATIENT
Start: 2017-08-30 | End: 2017-08-30

## 2017-08-30 RX ORDER — CELECOXIB 200 MG/1
200 CAPSULE ORAL ONCE
Qty: 0 | Refills: 0 | Status: DISCONTINUED | OUTPATIENT
Start: 2017-08-30 | End: 2017-09-14

## 2017-08-30 RX ORDER — ONDANSETRON 8 MG/1
4 TABLET, FILM COATED ORAL ONCE
Qty: 0 | Refills: 0 | Status: DISCONTINUED | OUTPATIENT
Start: 2017-08-30 | End: 2017-09-14

## 2017-08-30 RX ORDER — SODIUM CHLORIDE 9 MG/ML
1000 INJECTION INTRAMUSCULAR; INTRAVENOUS; SUBCUTANEOUS
Qty: 0 | Refills: 0 | Status: DISCONTINUED | OUTPATIENT
Start: 2017-08-30 | End: 2017-09-14

## 2017-08-30 RX ADMIN — Medication 975 MILLIGRAM(S): at 13:19

## 2017-08-30 RX ADMIN — CELECOXIB 200 MILLIGRAM(S): 200 CAPSULE ORAL at 13:19

## 2017-08-30 NOTE — ASU DISCHARGE PLAN (ADULT/PEDIATRIC). - MEDICATION SUMMARY - MEDICATIONS TO TAKE
I will START or STAY ON the medications listed below when I get home from the hospital:    Motrin 600 mg oral tablet  -- 1 tab(s) by mouth every 6 hours  -- Indication: For Cramping/Pain    Tylenol 500 mg oral tablet  -- 2 tab(s) by mouth every 6 hours  -- Indication: For Cramping/Pain    tamoxifen 20 mg oral tablet  -- 1 tab(s) by mouth once a day  -- Indication: For Home medication    famotidine 20 mg oral tablet  --  by mouth once a day pm and am of surgery  -- Indication: For Home medication

## 2017-08-30 NOTE — BRIEF OPERATIVE NOTE - OPERATION/FINDINGS
EUA with enlarged uterus to 12+ weeks and large posterior myoma. Nl vulvar, vagina, cervix. No palpable adnexal masses. Sounded to 10 cm with sono guidance throughout. H/S, bland EM with apparent posterior and anterior myomas; ?septum at fundus. Nl EC canal.

## 2017-08-30 NOTE — ASU DISCHARGE PLAN (ADULT/PEDIATRIC). - NOTIFY
Bleeding that does not stop/GYN Fever>100.4/Inability to Tolerate Liquids or Foods/Pain not relieved by Medications/Unable to Urinate

## 2017-08-30 NOTE — PRE-ANESTHESIA EVALUATION ADULT - NSANTHADDINFOFT_GEN_ALL_CORE
Denies CP, SOB, cough, fever, acid reflux with italian and greek, greasy food. ok today never when NPO

## 2017-09-01 ENCOUNTER — TRANSCRIPTION ENCOUNTER (OUTPATIENT)
Age: 62
End: 2017-09-01

## 2017-09-01 LAB — SURGICAL PATHOLOGY STUDY: SIGNIFICANT CHANGE UP

## 2017-09-05 ENCOUNTER — CHART COPY (OUTPATIENT)
Age: 62
End: 2017-09-05

## 2017-10-16 ENCOUNTER — APPOINTMENT (OUTPATIENT)
Dept: GYNECOLOGIC ONCOLOGY | Facility: CLINIC | Age: 62
End: 2017-10-16
Payer: MEDICAID

## 2017-10-16 VITALS
BODY MASS INDEX: 24.33 KG/M2 | HEIGHT: 67 IN | SYSTOLIC BLOOD PRESSURE: 125 MMHG | DIASTOLIC BLOOD PRESSURE: 70 MMHG | WEIGHT: 155 LBS

## 2017-10-16 PROCEDURE — 99213 OFFICE O/P EST LOW 20 MIN: CPT

## 2017-11-29 ENCOUNTER — APPOINTMENT (OUTPATIENT)
Dept: ORTHOPEDIC SURGERY | Facility: CLINIC | Age: 62
End: 2017-11-29

## 2018-01-22 ENCOUNTER — APPOINTMENT (OUTPATIENT)
Dept: GYNECOLOGIC ONCOLOGY | Facility: CLINIC | Age: 63
End: 2018-01-22
Payer: MEDICAID

## 2018-01-22 VITALS
BODY MASS INDEX: 25.9 KG/M2 | SYSTOLIC BLOOD PRESSURE: 128 MMHG | HEIGHT: 67 IN | DIASTOLIC BLOOD PRESSURE: 72 MMHG | WEIGHT: 165 LBS

## 2018-01-22 PROCEDURE — 99213 OFFICE O/P EST LOW 20 MIN: CPT

## 2018-02-28 ENCOUNTER — FORM ENCOUNTER (OUTPATIENT)
Age: 63
End: 2018-02-28

## 2018-03-01 ENCOUNTER — APPOINTMENT (OUTPATIENT)
Dept: ULTRASOUND IMAGING | Facility: CLINIC | Age: 63
End: 2018-03-01
Payer: MEDICAID

## 2018-03-01 ENCOUNTER — OUTPATIENT (OUTPATIENT)
Dept: OUTPATIENT SERVICES | Facility: HOSPITAL | Age: 63
LOS: 1 days | End: 2018-03-01
Payer: COMMERCIAL

## 2018-03-01 DIAGNOSIS — Z98.89 OTHER SPECIFIED POSTPROCEDURAL STATES: Chronic | ICD-10-CM

## 2018-03-01 DIAGNOSIS — Z00.8 ENCOUNTER FOR OTHER GENERAL EXAMINATION: ICD-10-CM

## 2018-03-01 PROCEDURE — 76830 TRANSVAGINAL US NON-OB: CPT

## 2018-03-01 PROCEDURE — 76830 TRANSVAGINAL US NON-OB: CPT | Mod: 26

## 2018-05-24 ENCOUNTER — NON-APPOINTMENT (OUTPATIENT)
Age: 63
End: 2018-05-24

## 2018-05-24 ENCOUNTER — APPOINTMENT (OUTPATIENT)
Dept: INTERNAL MEDICINE | Facility: CLINIC | Age: 63
End: 2018-05-24
Payer: MEDICAID

## 2018-05-24 VITALS
WEIGHT: 150 LBS | SYSTOLIC BLOOD PRESSURE: 124 MMHG | RESPIRATION RATE: 14 BRPM | OXYGEN SATURATION: 98 % | BODY MASS INDEX: 23.54 KG/M2 | TEMPERATURE: 97.5 F | HEIGHT: 67 IN | DIASTOLIC BLOOD PRESSURE: 76 MMHG | HEART RATE: 76 BPM

## 2018-05-24 DIAGNOSIS — H26.9 UNSPECIFIED CATARACT: ICD-10-CM

## 2018-05-24 PROCEDURE — 99214 OFFICE O/P EST MOD 30 MIN: CPT | Mod: 25

## 2018-05-24 PROCEDURE — 93000 ELECTROCARDIOGRAM COMPLETE: CPT

## 2018-05-24 NOTE — HISTORY OF PRESENT ILLNESS
[Coronary Artery Disease] : no coronary artery disease [Diabetes] : no diabetes [Sleep Apnea] : no sleep apnea [COPD] : no COPD [Previous Adverse Anesthesia Reaction] : no previous adverse anesthesia reaction [FreeTextEntry1] : Right cataract surgery [FreeTextEntry2] : 6/19/18 [FreeTextEntry3] : Dr. Capone [FreeTextEntry4] : Here for presurgical evaluation prior to Right cataract surgery on 6/19/18.  Pt denies chest pain, dyspnea, or lightheadedness.\par Pt does complain about chronic right hip pain for 6 months.

## 2018-05-24 NOTE — PLAN
[FreeTextEntry1] : Recommend routine perioperative hemodynamic monitoring\par Pt will see an orthopedist regarding Right hip pain

## 2018-05-24 NOTE — ASSESSMENT
[FreeTextEntry4] : EKG NSR\par No medical contraindications to proceeding with the planned cataract surgery\par

## 2018-06-01 ENCOUNTER — OTHER (OUTPATIENT)
Age: 63
End: 2018-06-01

## 2018-06-07 ENCOUNTER — APPOINTMENT (OUTPATIENT)
Dept: INTERNAL MEDICINE | Facility: CLINIC | Age: 63
End: 2018-06-07
Payer: MEDICAID

## 2018-06-07 ENCOUNTER — OUTPATIENT (OUTPATIENT)
Dept: OUTPATIENT SERVICES | Facility: HOSPITAL | Age: 63
LOS: 1 days | End: 2018-06-07
Payer: COMMERCIAL

## 2018-06-07 VITALS
HEIGHT: 67 IN | BODY MASS INDEX: 27.62 KG/M2 | OXYGEN SATURATION: 93 % | RESPIRATION RATE: 14 BRPM | DIASTOLIC BLOOD PRESSURE: 70 MMHG | TEMPERATURE: 98 F | HEART RATE: 79 BPM | SYSTOLIC BLOOD PRESSURE: 110 MMHG | WEIGHT: 176 LBS

## 2018-06-07 VITALS
HEIGHT: 67 IN | OXYGEN SATURATION: 100 % | HEART RATE: 88 BPM | DIASTOLIC BLOOD PRESSURE: 75 MMHG | SYSTOLIC BLOOD PRESSURE: 113 MMHG | TEMPERATURE: 98 F | WEIGHT: 175.93 LBS | RESPIRATION RATE: 18 BRPM

## 2018-06-07 DIAGNOSIS — R93.8 ABNORMAL FINDINGS ON DIAGNOSTIC IMAGING OF OTHER SPECIFIED BODY STRUCTURES: ICD-10-CM

## 2018-06-07 DIAGNOSIS — Z01.818 ENCOUNTER FOR OTHER PREPROCEDURAL EXAMINATION: ICD-10-CM

## 2018-06-07 DIAGNOSIS — C50.919 MALIGNANT NEOPLASM OF UNSPECIFIED SITE OF UNSPECIFIED FEMALE BREAST: ICD-10-CM

## 2018-06-07 DIAGNOSIS — Z98.89 OTHER SPECIFIED POSTPROCEDURAL STATES: Chronic | ICD-10-CM

## 2018-06-07 DIAGNOSIS — D25.9 LEIOMYOMA OF UTERUS, UNSPECIFIED: ICD-10-CM

## 2018-06-07 DIAGNOSIS — N88.2 STRICTURE AND STENOSIS OF CERVIX UTERI: ICD-10-CM

## 2018-06-07 DIAGNOSIS — Z98.890 OTHER SPECIFIED POSTPROCEDURAL STATES: Chronic | ICD-10-CM

## 2018-06-07 LAB
HCT VFR BLD CALC: 39.1 % — SIGNIFICANT CHANGE UP (ref 34.5–45)
HGB BLD-MCNC: 12.2 G/DL — SIGNIFICANT CHANGE UP (ref 11.5–15.5)
MCHC RBC-ENTMCNC: 26.1 PG — LOW (ref 27–34)
MCHC RBC-ENTMCNC: 31.2 GM/DL — LOW (ref 32–36)
MCV RBC AUTO: 83.7 FL — SIGNIFICANT CHANGE UP (ref 80–100)
PLATELET # BLD AUTO: 270 K/UL — SIGNIFICANT CHANGE UP (ref 150–400)
RBC # BLD: 4.67 M/UL — SIGNIFICANT CHANGE UP (ref 3.8–5.2)
RBC # FLD: 13.7 % — SIGNIFICANT CHANGE UP (ref 10.3–14.5)
WBC # BLD: 8.68 K/UL — SIGNIFICANT CHANGE UP (ref 3.8–10.5)
WBC # FLD AUTO: 8.68 K/UL — SIGNIFICANT CHANGE UP (ref 3.8–10.5)

## 2018-06-07 PROCEDURE — 99214 OFFICE O/P EST MOD 30 MIN: CPT

## 2018-06-07 PROCEDURE — 85027 COMPLETE CBC AUTOMATED: CPT

## 2018-06-07 PROCEDURE — G0463: CPT

## 2018-06-07 RX ORDER — SODIUM CHLORIDE 9 MG/ML
3 INJECTION INTRAMUSCULAR; INTRAVENOUS; SUBCUTANEOUS EVERY 8 HOURS
Qty: 0 | Refills: 0 | Status: DISCONTINUED | OUTPATIENT
Start: 2018-06-12 | End: 2018-06-27

## 2018-06-07 RX ORDER — IBUPROFEN 200 MG
1 TABLET ORAL
Qty: 0 | Refills: 0 | COMMUNITY

## 2018-06-07 RX ORDER — ACETAMINOPHEN 500 MG
1000 TABLET ORAL ONCE
Qty: 0 | Refills: 0 | Status: COMPLETED | OUTPATIENT
Start: 2018-06-12 | End: 2018-06-12

## 2018-06-07 RX ORDER — LIDOCAINE HCL 20 MG/ML
0.2 VIAL (ML) INJECTION ONCE
Qty: 0 | Refills: 0 | Status: DISCONTINUED | OUTPATIENT
Start: 2018-06-12 | End: 2018-06-27

## 2018-06-07 RX ORDER — ACETAMINOPHEN 500 MG
2 TABLET ORAL
Qty: 0 | Refills: 0 | COMMUNITY

## 2018-06-07 RX ORDER — FAMOTIDINE 10 MG/ML
0 INJECTION INTRAVENOUS
Qty: 0 | Refills: 0 | COMMUNITY

## 2018-06-07 NOTE — H&P PST ADULT - ASSESSMENT
leiomyoma of uterus  stricture and stenosis of cervix uteri  abnormal findings on diagnostic imaging of other specified body structure

## 2018-06-07 NOTE — H&P PST ADULT - HISTORY OF PRESENT ILLNESS
This is a 62 y/o  female a routine sonogram revealed fibroids, she presents today for D&C, hysteroscopy with sono guidance

## 2018-06-07 NOTE — H&P PST ADULT - BACK
Report called to Ang CUELLO at [facility]. ANEL called for transport, spoke with Joao    No deformity or limitation of movement

## 2018-06-08 NOTE — ADDENDUM
[FreeTextEntry1] : Presurgical labs are within acceptable limits\par There are no medical contraindications to proceeding with the planned surgery.\par I recommend routine perioperative hemodynamic monitoring

## 2018-06-08 NOTE — HISTORY OF PRESENT ILLNESS
[( Patient denies any chest pain, claudication, dyspnea on exertion, orthopnea, palpitations or syncope )] : Patient denies any chest pain, claudication, dyspnea on exertion, orthopnea, palpitations or syncope. [Coronary Artery Disease] : no coronary artery disease [Diabetes] : no diabetes [Sleep Apnea] : no sleep apnea [COPD] : no COPD [Previous Adverse Anesthesia Reaction] : no previous adverse anesthesia reaction [FreeTextEntry1] : Diagnostic Hysteroscopy, D&C [FreeTextEntry2] : 6/12/18 [FreeTextEntry3] : Dr. Ian Patel [FreeTextEntry4] : Here for presurgical evaluation. Feeling well. Denies chest pain, dyspnea, lightheadedness.

## 2018-06-11 ENCOUNTER — TRANSCRIPTION ENCOUNTER (OUTPATIENT)
Age: 63
End: 2018-06-11

## 2018-06-11 RX ORDER — OXYCODONE HYDROCHLORIDE 5 MG/1
5 TABLET ORAL ONCE
Qty: 0 | Refills: 0 | Status: DISCONTINUED | OUTPATIENT
Start: 2018-06-12 | End: 2018-06-12

## 2018-06-11 RX ORDER — CELECOXIB 200 MG/1
200 CAPSULE ORAL ONCE
Qty: 0 | Refills: 0 | Status: DISCONTINUED | OUTPATIENT
Start: 2018-06-12 | End: 2018-06-27

## 2018-06-11 RX ORDER — ONDANSETRON 8 MG/1
4 TABLET, FILM COATED ORAL ONCE
Qty: 0 | Refills: 0 | Status: DISCONTINUED | OUTPATIENT
Start: 2018-06-12 | End: 2018-06-27

## 2018-06-11 RX ORDER — SODIUM CHLORIDE 9 MG/ML
1000 INJECTION, SOLUTION INTRAVENOUS
Qty: 0 | Refills: 0 | Status: DISCONTINUED | OUTPATIENT
Start: 2018-06-12 | End: 2018-06-27

## 2018-06-12 ENCOUNTER — APPOINTMENT (OUTPATIENT)
Dept: GYNECOLOGIC ONCOLOGY | Facility: HOSPITAL | Age: 63
End: 2018-06-12

## 2018-06-12 ENCOUNTER — OUTPATIENT (OUTPATIENT)
Dept: OUTPATIENT SERVICES | Facility: HOSPITAL | Age: 63
LOS: 1 days | End: 2018-06-12
Payer: COMMERCIAL

## 2018-06-12 ENCOUNTER — APPOINTMENT (OUTPATIENT)
Dept: ULTRASOUND IMAGING | Facility: HOSPITAL | Age: 63
End: 2018-06-12

## 2018-06-12 ENCOUNTER — RESULT REVIEW (OUTPATIENT)
Age: 63
End: 2018-06-12

## 2018-06-12 VITALS
DIASTOLIC BLOOD PRESSURE: 75 MMHG | SYSTOLIC BLOOD PRESSURE: 120 MMHG | WEIGHT: 175.93 LBS | HEIGHT: 67 IN | HEART RATE: 83 BPM | RESPIRATION RATE: 18 BRPM | TEMPERATURE: 98 F | OXYGEN SATURATION: 100 %

## 2018-06-12 VITALS
HEART RATE: 80 BPM | OXYGEN SATURATION: 98 % | SYSTOLIC BLOOD PRESSURE: 132 MMHG | RESPIRATION RATE: 15 BRPM | DIASTOLIC BLOOD PRESSURE: 76 MMHG

## 2018-06-12 DIAGNOSIS — Z98.890 OTHER SPECIFIED POSTPROCEDURAL STATES: Chronic | ICD-10-CM

## 2018-06-12 DIAGNOSIS — R93.8 ABNORMAL FINDINGS ON DIAGNOSTIC IMAGING OF OTHER SPECIFIED BODY STRUCTURES: ICD-10-CM

## 2018-06-12 DIAGNOSIS — Z98.89 OTHER SPECIFIED POSTPROCEDURAL STATES: Chronic | ICD-10-CM

## 2018-06-12 DIAGNOSIS — Z01.818 ENCOUNTER FOR OTHER PREPROCEDURAL EXAMINATION: ICD-10-CM

## 2018-06-12 DIAGNOSIS — D25.9 LEIOMYOMA OF UTERUS, UNSPECIFIED: ICD-10-CM

## 2018-06-12 DIAGNOSIS — N88.2 STRICTURE AND STENOSIS OF CERVIX UTERI: ICD-10-CM

## 2018-06-12 PROCEDURE — 88305 TISSUE EXAM BY PATHOLOGIST: CPT | Mod: 26

## 2018-06-12 PROCEDURE — 58558 HYSTEROSCOPY BIOPSY: CPT

## 2018-06-12 PROCEDURE — 88305 TISSUE EXAM BY PATHOLOGIST: CPT

## 2018-06-12 PROCEDURE — 58555 HYSTEROSCOPY DX SEP PROC: CPT

## 2018-06-12 RX ORDER — CELECOXIB 200 MG/1
200 CAPSULE ORAL ONCE
Qty: 0 | Refills: 0 | Status: COMPLETED | OUTPATIENT
Start: 2018-06-12 | End: 2018-06-12

## 2018-06-12 RX ADMIN — Medication 1000 MILLIGRAM(S): at 10:40

## 2018-06-12 RX ADMIN — CELECOXIB 200 MILLIGRAM(S): 200 CAPSULE ORAL at 10:41

## 2018-06-12 NOTE — ASU DISCHARGE PLAN (ADULT/PEDIATRIC). - MEDICATION SUMMARY - MEDICATIONS TO TAKE
I will START or STAY ON the medications listed below when I get home from the hospital:    tamoxifen 20 mg oral tablet  -- 1 tab(s) by mouth once a day (at bedtime)  -- Indication: For home med

## 2018-06-12 NOTE — ASU DISCHARGE PLAN (ADULT/PEDIATRIC). - NOTIFY
GYN Fever>100.4/Inability to Tolerate Liquids or Foods/Bleeding that does not stop/Pain not relieved by Medications Pain not relieved by Medications/Unable to Urinate/GYN Fever>100.4/Inability to Tolerate Liquids or Foods/Persistent Nausea and Vomiting/Bleeding that does not stop

## 2018-06-12 NOTE — BRIEF OPERATIVE NOTE - OPERATION/FINDINGS
Normal vulva, vagina, pm, cervix. Uterus was palpably enlarged to ~14 weeks. There were no palpable adnexal masses.     H/S: Multiple apparent submucous myoma. Visualized EM otherwise appeared without lesions but cavity distorted with myomas. Possible synechia seen.     EM appear thin on sono compared with preop-according to the sono tech.

## 2018-06-12 NOTE — BRIEF OPERATIVE NOTE - PROCEDURE
<<-----Click on this checkbox to enter Procedure Hysteroscopy with dilation and curettage of uterus  06/12/2018    Active  AMENZIN

## 2018-06-13 LAB — SURGICAL PATHOLOGY STUDY: SIGNIFICANT CHANGE UP

## 2018-06-20 ENCOUNTER — RX RENEWAL (OUTPATIENT)
Age: 63
End: 2018-06-20

## 2018-07-09 ENCOUNTER — APPOINTMENT (OUTPATIENT)
Dept: GYNECOLOGIC ONCOLOGY | Facility: CLINIC | Age: 63
End: 2018-07-09
Payer: MEDICAID

## 2018-07-09 PROCEDURE — 99212 OFFICE O/P EST SF 10 MIN: CPT

## 2018-07-30 PROBLEM — K43.9 VENTRAL HERNIA WITHOUT OBSTRUCTION OR GANGRENE: Chronic | Status: ACTIVE | Noted: 2017-02-07

## 2018-08-22 ENCOUNTER — APPOINTMENT (OUTPATIENT)
Dept: ULTRASOUND IMAGING | Facility: CLINIC | Age: 63
End: 2018-08-22
Payer: MEDICAID

## 2018-08-22 ENCOUNTER — OUTPATIENT (OUTPATIENT)
Dept: OUTPATIENT SERVICES | Facility: HOSPITAL | Age: 63
LOS: 1 days | End: 2018-08-22
Payer: COMMERCIAL

## 2018-08-22 ENCOUNTER — APPOINTMENT (OUTPATIENT)
Dept: MAMMOGRAPHY | Facility: CLINIC | Age: 63
End: 2018-08-22
Payer: MEDICAID

## 2018-08-22 DIAGNOSIS — Z00.8 ENCOUNTER FOR OTHER GENERAL EXAMINATION: ICD-10-CM

## 2018-08-22 DIAGNOSIS — Z98.890 OTHER SPECIFIED POSTPROCEDURAL STATES: Chronic | ICD-10-CM

## 2018-08-22 DIAGNOSIS — Z98.89 OTHER SPECIFIED POSTPROCEDURAL STATES: Chronic | ICD-10-CM

## 2018-08-22 PROCEDURE — 77066 DX MAMMO INCL CAD BI: CPT | Mod: 26

## 2018-08-22 PROCEDURE — 77066 DX MAMMO INCL CAD BI: CPT

## 2018-08-22 PROCEDURE — G0279: CPT | Mod: 26

## 2018-08-22 PROCEDURE — 76641 ULTRASOUND BREAST COMPLETE: CPT

## 2018-08-22 PROCEDURE — 76641 ULTRASOUND BREAST COMPLETE: CPT | Mod: 26,50

## 2018-08-22 PROCEDURE — G0279: CPT

## 2018-09-09 ENCOUNTER — RX RENEWAL (OUTPATIENT)
Age: 63
End: 2018-09-09

## 2018-09-12 NOTE — PROVIDER CONTACT NOTE (CRITICAL VALUE NOTIFICATION) - ACTION/TREATMENT ORDERED:
ED MD aware. IV fluids infusing. CT Abdomen ordered. Will reassess.
Pt will ambulate x 200 feet independently in 2-3 sessions

## 2018-10-02 ENCOUNTER — OUTPATIENT (OUTPATIENT)
Dept: OUTPATIENT SERVICES | Facility: HOSPITAL | Age: 63
LOS: 1 days | Discharge: HOME | End: 2018-10-02
Payer: MEDICAID

## 2018-10-02 DIAGNOSIS — C50.919 MALIGNANT NEOPLASM OF UNSPECIFIED SITE OF UNSPECIFIED FEMALE BREAST: ICD-10-CM

## 2018-10-02 DIAGNOSIS — Z98.89 OTHER SPECIFIED POSTPROCEDURAL STATES: Chronic | ICD-10-CM

## 2018-10-02 DIAGNOSIS — Z98.890 OTHER SPECIFIED POSTPROCEDURAL STATES: Chronic | ICD-10-CM

## 2018-10-08 PROBLEM — K44.9 HIATAL HERNIA: Status: ACTIVE | Noted: 2018-10-08

## 2018-10-11 ENCOUNTER — RESULT REVIEW (OUTPATIENT)
Age: 63
End: 2018-10-11

## 2018-10-11 ENCOUNTER — APPOINTMENT (OUTPATIENT)
Dept: HEMATOLOGY ONCOLOGY | Facility: CLINIC | Age: 63
End: 2018-10-11
Payer: MEDICAID

## 2018-10-11 VITALS
DIASTOLIC BLOOD PRESSURE: 81 MMHG | OXYGEN SATURATION: 99 % | RESPIRATION RATE: 18 BRPM | WEIGHT: 174.17 LBS | HEART RATE: 79 BPM | BODY MASS INDEX: 27.34 KG/M2 | SYSTOLIC BLOOD PRESSURE: 132 MMHG | TEMPERATURE: 98.2 F | HEIGHT: 66.93 IN

## 2018-10-11 DIAGNOSIS — K44.9 DIAPHRAGMATIC HERNIA W/OUT OBSTRUCTION OR GANGRENE: ICD-10-CM

## 2018-10-11 LAB
BASOPHILS # BLD AUTO: 0.1 K/UL — SIGNIFICANT CHANGE UP (ref 0–0.2)
BASOPHILS NFR BLD AUTO: 0.7 % — SIGNIFICANT CHANGE UP (ref 0–2)
EOSINOPHIL # BLD AUTO: 0.2 K/UL — SIGNIFICANT CHANGE UP (ref 0–0.5)
EOSINOPHIL NFR BLD AUTO: 2 % — SIGNIFICANT CHANGE UP (ref 0–6)
HCT VFR BLD CALC: 40.6 % — SIGNIFICANT CHANGE UP (ref 34.5–45)
HGB BLD-MCNC: 13.5 G/DL — SIGNIFICANT CHANGE UP (ref 11.5–15.5)
LYMPHOCYTES # BLD AUTO: 2.8 K/UL — SIGNIFICANT CHANGE UP (ref 1–3.3)
LYMPHOCYTES # BLD AUTO: 34.1 % — SIGNIFICANT CHANGE UP (ref 13–44)
MCHC RBC-ENTMCNC: 27.5 PG — SIGNIFICANT CHANGE UP (ref 27–34)
MCHC RBC-ENTMCNC: 33.2 G/DL — SIGNIFICANT CHANGE UP (ref 32–36)
MCV RBC AUTO: 82.9 FL — SIGNIFICANT CHANGE UP (ref 80–100)
MONOCYTES # BLD AUTO: 0.5 K/UL — SIGNIFICANT CHANGE UP (ref 0–0.9)
MONOCYTES NFR BLD AUTO: 6.3 % — SIGNIFICANT CHANGE UP (ref 2–14)
NEUTROPHILS # BLD AUTO: 4.7 K/UL — SIGNIFICANT CHANGE UP (ref 1.8–7.4)
NEUTROPHILS NFR BLD AUTO: 56.8 % — SIGNIFICANT CHANGE UP (ref 43–77)
PLATELET # BLD AUTO: 260 K/UL — SIGNIFICANT CHANGE UP (ref 150–400)
RBC # BLD: 4.89 M/UL — SIGNIFICANT CHANGE UP (ref 3.8–5.2)
RBC # FLD: 12.4 % — SIGNIFICANT CHANGE UP (ref 10.3–14.5)
WBC # BLD: 8.3 K/UL — SIGNIFICANT CHANGE UP (ref 3.8–10.5)
WBC # FLD AUTO: 8.3 K/UL — SIGNIFICANT CHANGE UP (ref 3.8–10.5)

## 2018-10-11 PROCEDURE — 88321 CONSLTJ&REPRT SLD PREP ELSWR: CPT

## 2018-10-11 PROCEDURE — 99205 OFFICE O/P NEW HI 60 MIN: CPT

## 2018-10-15 ENCOUNTER — RESULT REVIEW (OUTPATIENT)
Age: 63
End: 2018-10-15

## 2018-10-15 PROCEDURE — 88321 CONSLTJ&REPRT SLD PREP ELSWR: CPT

## 2018-10-17 ENCOUNTER — MESSAGE (OUTPATIENT)
Age: 63
End: 2018-10-17

## 2018-10-17 LAB
ALBUMIN SERPL ELPH-MCNC: 4.7 G/DL
ALP BLD-CCNC: 67 U/L
ALT SERPL-CCNC: 15 U/L
ANION GAP SERPL CALC-SCNC: 12 MMOL/L
AST SERPL-CCNC: 19 U/L
BILIRUB SERPL-MCNC: 0.2 MG/DL
BUN SERPL-MCNC: 20 MG/DL
CALCIUM SERPL-MCNC: 9.5 MG/DL
CHLORIDE SERPL-SCNC: 104 MMOL/L
CO2 SERPL-SCNC: 23 MMOL/L
CREAT SERPL-MCNC: 0.76 MG/DL
GLUCOSE SERPL-MCNC: 92 MG/DL
POTASSIUM SERPL-SCNC: 4.8 MMOL/L
PROT SERPL-MCNC: 7.4 G/DL
SODIUM SERPL-SCNC: 139 MMOL/L

## 2018-12-10 ENCOUNTER — APPOINTMENT (OUTPATIENT)
Dept: SURGERY | Facility: CLINIC | Age: 63
End: 2018-12-10
Payer: MEDICAID

## 2018-12-10 VITALS
BODY MASS INDEX: 26.37 KG/M2 | TEMPERATURE: 98.2 F | SYSTOLIC BLOOD PRESSURE: 122 MMHG | HEIGHT: 67.5 IN | DIASTOLIC BLOOD PRESSURE: 72 MMHG | HEART RATE: 88 BPM | WEIGHT: 170 LBS

## 2018-12-10 PROCEDURE — 99243 OFF/OP CNSLTJ NEW/EST LOW 30: CPT | Mod: 25

## 2018-12-10 PROCEDURE — 45300 PROCTOSIGMOIDOSCOPY DX: CPT

## 2019-01-01 NOTE — PATIENT PROFILE ADULT. - VISION (WITH CORRECTIVE LENSES IF THE PATIENT USUALLY WEARS THEM):
Normal vision: sees adequately in most situations; can see medication labels, newsprint
Right Foot/Right Hand

## 2019-02-15 ENCOUNTER — APPOINTMENT (OUTPATIENT)
Dept: INTERNAL MEDICINE | Facility: CLINIC | Age: 64
End: 2019-02-15

## 2019-04-09 ENCOUNTER — OUTPATIENT (OUTPATIENT)
Dept: OUTPATIENT SERVICES | Facility: HOSPITAL | Age: 64
LOS: 1 days | Discharge: HOME | End: 2019-04-09

## 2019-04-09 DIAGNOSIS — Z98.890 OTHER SPECIFIED POSTPROCEDURAL STATES: Chronic | ICD-10-CM

## 2019-04-09 DIAGNOSIS — Z98.89 OTHER SPECIFIED POSTPROCEDURAL STATES: Chronic | ICD-10-CM

## 2019-04-09 DIAGNOSIS — C50.919 MALIGNANT NEOPLASM OF UNSPECIFIED SITE OF UNSPECIFIED FEMALE BREAST: ICD-10-CM

## 2019-04-16 ENCOUNTER — APPOINTMENT (OUTPATIENT)
Dept: HEMATOLOGY ONCOLOGY | Facility: CLINIC | Age: 64
End: 2019-04-16
Payer: MEDICAID

## 2019-04-16 ENCOUNTER — RESULT REVIEW (OUTPATIENT)
Age: 64
End: 2019-04-16

## 2019-04-16 VITALS
RESPIRATION RATE: 18 BRPM | OXYGEN SATURATION: 97 % | BODY MASS INDEX: 26.33 KG/M2 | DIASTOLIC BLOOD PRESSURE: 81 MMHG | HEART RATE: 82 BPM | SYSTOLIC BLOOD PRESSURE: 130 MMHG | WEIGHT: 170.63 LBS | TEMPERATURE: 98.2 F

## 2019-04-16 LAB
HCT VFR BLD CALC: 37.3 % — SIGNIFICANT CHANGE UP (ref 34.5–45)
HGB BLD-MCNC: 12.9 G/DL — SIGNIFICANT CHANGE UP (ref 11.5–15.5)
MCHC RBC-ENTMCNC: 28.7 PG — SIGNIFICANT CHANGE UP (ref 27–34)
MCHC RBC-ENTMCNC: 34.5 G/DL — SIGNIFICANT CHANGE UP (ref 32–36)
MCV RBC AUTO: 83.2 FL — SIGNIFICANT CHANGE UP (ref 80–100)
PLATELET # BLD AUTO: 286 K/UL — SIGNIFICANT CHANGE UP (ref 150–400)
RBC # BLD: 4.48 M/UL — SIGNIFICANT CHANGE UP (ref 3.8–5.2)
RBC # FLD: 12.2 % — SIGNIFICANT CHANGE UP (ref 10.3–14.5)
WBC # BLD: 8.5 K/UL — SIGNIFICANT CHANGE UP (ref 3.8–10.5)
WBC # FLD AUTO: 8.5 K/UL — SIGNIFICANT CHANGE UP (ref 3.8–10.5)

## 2019-04-16 PROCEDURE — 99213 OFFICE O/P EST LOW 20 MIN: CPT

## 2019-04-16 NOTE — REASON FOR VISIT
[Follow-Up Visit] : a follow-up [FreeTextEntry2] :  T1B N0 ER+ breast cancer on tamoxifen with endometrial hyperplasia. \par

## 2019-04-16 NOTE — ASSESSMENT
[Curative] : Goals of care discussed with patient: Curative [Palliative Care Plan] : not applicable at this time [FreeTextEntry1] : 63 year old null parous female who developed a low grade T1CN0 right breast cancer ER+ treated with lumpectomy, radiation, and tamoxifen. She has almost completed 5 years of hormonal adjuvant therapy. As the lesion is low grade and small, stop tamoxifen at 5 years. Nothing to suggest a genetic syndrome. VERÓNICA/stable.\par \par Plan:\par 1) get copy of recent mammogram\par 2) continue tamoxifen 20 mg/d until May 2019\par 3) baseline CMP\par 4) yearly followup\par 5) mammogram end of August 2019

## 2019-04-16 NOTE — HISTORY OF PRESENT ILLNESS
[Disease: _____________________] : Disease: [unfilled] [T: ___] : T[unfilled] [AJCC Stage: ____] : AJCC Stage: [unfilled] [N: ___] : N[unfilled] [M: ___] : M[unfilled] [Treatment Protocol] : Treatment Protocol [Cardiovascular] : Cardiovascular [ENT] : ENT [Constitutional] : Constitutional [Dermatologic] : Dermatologic [Endocrine] : Endocrine [Gastrointestinal] : Gastrointestinal [Genitourinary] : Genitourinary [Gynecologic] : Gynecologic [Infectious] : Infectious [Musculoskeletal] : Musculoskeletal [Neurologic] : Neurologic [Pain] : Pain [Pulmonary] : Pulmonary [Hematologic] : Hematologic [Date: ____________] : Patient's last distress assessment performed on [unfilled]. [1 - Distress Level] : Distress Level: 1 [de-identified] : ER+ 100%  [place]+80%  Her2/amanda negative [de-identified] : well differentiated duct cell carcinoma with rare foci of DCIS [de-identified] : 63 year old female who changed insurance and therefore physicians. At age 58 years, a screening mammogram 10/22/2013 demonstrated distortion in the right upper quadrant of the breast which confirmatory ultrasound 11/14/2013.  Ultrasound core needle biopsy 12/06/2013 positive for a low grade carcinoma a right breast invasive duct cell carcinoma treated in 2/13/2014 with lumpectomy and radiation. The primary was 0.9 cm with a negative sentinel node ER+ MI+ Her2/amanda negative (Accession # [id number]) . The posterior margin was positive. She had 6 months of hormone replacement therapy.  She was placed on tamoxifen, developed significant hot flashes for 2 1/2 years, and followed at [facility] by Dr. Celso Wright. 2014 bone density WNL. Her major problem was endometrial hyperplasia requiring GYN follow up and D&C. She was resistant to switching adjuvant therapy to an AI.   8/07/2017 breast MRI negative; mammogram benign calcifications. Mammogram/sonogram 9/2018 negative. \par \par Bone density 1/29/2018: T = 0.2 femur. Finished My 11, 2019. Followed by GYN for endometrial hyperplasia.  [FreeTextEntry1] : tamoxifen start May 2014 [de-identified] : 0.9 cm primary; 0/1 sentinel nodes; Grade 1\par right lumpectomy 2014 [de-identified] : Doing well.

## 2019-04-16 NOTE — PHYSICAL EXAM
[Fully active, able to carry on all pre-disease performance without restriction] : Status 0 - Fully active, able to carry on all pre-disease performance without restriction [Normal] : affect appropriate [de-identified] : right lumpectomy scar; no recurrence

## 2019-04-18 LAB
ALBUMIN SERPL ELPH-MCNC: 4.4 G/DL
ALP BLD-CCNC: 55 U/L
ALT SERPL-CCNC: 13 U/L
ANION GAP SERPL CALC-SCNC: 12 MMOL/L
AST SERPL-CCNC: 17 U/L
BILIRUB SERPL-MCNC: 0.2 MG/DL
BUN SERPL-MCNC: 20 MG/DL
CALCIUM SERPL-MCNC: 9.5 MG/DL
CANCER AG27-29 SERPL-ACNC: 21.4 U/ML
CEA SERPL-MCNC: 1.1 NG/ML
CHLORIDE SERPL-SCNC: 102 MMOL/L
CO2 SERPL-SCNC: 27 MMOL/L
CREAT SERPL-MCNC: 0.84 MG/DL
GLUCOSE SERPL-MCNC: 95 MG/DL
POTASSIUM SERPL-SCNC: 4.6 MMOL/L
PROT SERPL-MCNC: 7 G/DL
SODIUM SERPL-SCNC: 141 MMOL/L

## 2019-04-25 ENCOUNTER — MESSAGE (OUTPATIENT)
Age: 64
End: 2019-04-25

## 2019-04-30 DIAGNOSIS — Z01.818 ENCOUNTER FOR OTHER PREPROCEDURAL EXAMINATION: ICD-10-CM

## 2019-05-19 ENCOUNTER — LABORATORY RESULT (OUTPATIENT)
Age: 64
End: 2019-05-19

## 2019-05-20 ENCOUNTER — APPOINTMENT (OUTPATIENT)
Dept: GYNECOLOGIC ONCOLOGY | Facility: CLINIC | Age: 64
End: 2019-05-20
Payer: MEDICAID

## 2019-05-20 VITALS
HEIGHT: 67 IN | HEART RATE: 85 BPM | SYSTOLIC BLOOD PRESSURE: 132 MMHG | BODY MASS INDEX: 26.37 KG/M2 | DIASTOLIC BLOOD PRESSURE: 83 MMHG | WEIGHT: 168 LBS

## 2019-05-20 PROCEDURE — 99213 OFFICE O/P EST LOW 20 MIN: CPT | Mod: 25

## 2019-05-20 PROCEDURE — 58100 BIOPSY OF UTERUS LINING: CPT

## 2019-05-22 NOTE — PHYSICAL EXAM
[Normal] : Recto-Vaginal Exam: Normal [de-identified] : deferred [de-identified] : 14 week size uterus [de-identified] : non palpable  [de-identified] : no VB noted [Fully active, able to carry on all pre-disease performance without restriction] : Status 0 - Fully active, able to carry on all pre-disease performance without restriction

## 2019-05-22 NOTE — HISTORY OF PRESENT ILLNESS
[FreeTextEntry1] : GYN: Dr. Juan Lewis\par PCP: Dr. Mainor Arnett\par GI: Dr. Ron Carreon\par Breast Surgeon: Dr. Pranav Irving\par Med/Onc: Dr. Ng\[facility] \par She was diagnosed in 2013 with  ER/KS (+) Breast Cancer, is s/p right lumpectomy followed by RT, and started on Tamoxifen.  She has Dr. Rodriguez and Dr. Patel for thickened endometrial lining.  She opted to stay on Tamoxifen.  \par \par D&C with Dr. Patel was in June of 2018, and was negative with superficial strips of inactive endometrium.\[facility] \par Patient finished Tamoxifen treatment 1 week ago.  She called on 5/17/19 with complaints of light VB.  She was advised to follow up in our office.

## 2019-05-22 NOTE — PROCEDURE
General Surgery Daily Progress Note    Subjective:  No acute events overnight.  Having good bowel function.  Tolerated full liquid diet yesterday.  NG clamped.      Objective:  Temp:  [96.7  F (35.9  C)-98.6  F (37  C)] 96.7  F (35.9  C)  Heart Rate:  [] 77  Resp:  [20-24] 22  BP: ()/(47-70) 97/52  SpO2:  [98 %-99 %] 98 %    I/O last 3 completed shifts:  In: 1190.42 [P.O.:548; I.V.:642.42]  Out: 754 [Urine:250; Emesis/NG output:54; Other:450]    Physical Exam  General: Patient laying in bed in NAD.  Sleeping  HEENT: NG tube clamped  Pulm: Non-labored breathing  Abd: Soft and non tender.  Non distended    Assessment and Plan  6 year old male with history of bilateral inguinal hernia repair and ex lap as an infant with prior small bowel obstructions presents with a recurrent small bowel obstruction.  Appears to have resolved.     - Okay to pull NG tube  - Advance diet as tolerated  - Okay to discharge when tolerating regular diet.   - Follow up with Dr. Ma in clinic in 2 weeks.     Staff - Dr Anil Downey  General Surgery PGY-2  Pager [phone]    -----    Attending Attestation:  April 19, 2017    Clifford Beth was seen and examined with team. I agree with note and plan as discussed.    Doing well, making steady progress; mother updated and comfortable with plan as d/w GI team.      Follow up as noted, sooner if interval concerns arise. If further issues, recommending UGI/SBFT vs. CT depending on presentation.  Suspect child high risk for obstruction recurrence, as d/w family and teams.    Benji Ma MD, PhD  Division of Pediatric Surgery, [facility] [phone]   [Endometrial Biopsy] : an endometrial biopsy [Postmenopausal Bleeding] : postmenopausal bleeding [Patient] : the patient [Verbal Consent] : verbal consent was obtained prior to the procedure and is detailed in the patient's record [Pelvic Pain] : no pelvic pain [Betadine] : betadine [Yes] : the specimen was sent to pathology [Specimen: ___] : Specimen: [unfilled] [No Complications] : none [Tolerated Well] : the patient tolerated the procedure well [0] : 0

## 2019-05-28 ENCOUNTER — APPOINTMENT (OUTPATIENT)
Dept: ULTRASOUND IMAGING | Facility: CLINIC | Age: 64
End: 2019-05-28
Payer: MEDICAID

## 2019-05-28 ENCOUNTER — OUTPATIENT (OUTPATIENT)
Dept: OUTPATIENT SERVICES | Facility: HOSPITAL | Age: 64
LOS: 1 days | End: 2019-05-28
Payer: COMMERCIAL

## 2019-05-28 DIAGNOSIS — Z98.890 OTHER SPECIFIED POSTPROCEDURAL STATES: Chronic | ICD-10-CM

## 2019-05-28 DIAGNOSIS — Z98.89 OTHER SPECIFIED POSTPROCEDURAL STATES: Chronic | ICD-10-CM

## 2019-05-28 DIAGNOSIS — Z00.8 ENCOUNTER FOR OTHER GENERAL EXAMINATION: ICD-10-CM

## 2019-05-28 PROCEDURE — 76856 US EXAM PELVIC COMPLETE: CPT | Mod: 26

## 2019-05-28 PROCEDURE — 76830 TRANSVAGINAL US NON-OB: CPT | Mod: 26

## 2019-05-28 PROCEDURE — 76856 US EXAM PELVIC COMPLETE: CPT

## 2019-05-28 PROCEDURE — 76830 TRANSVAGINAL US NON-OB: CPT

## 2019-06-17 ENCOUNTER — APPOINTMENT (OUTPATIENT)
Dept: GYNECOLOGIC ONCOLOGY | Facility: CLINIC | Age: 64
End: 2019-06-17
Payer: MEDICAID

## 2019-06-17 VITALS
WEIGHT: 170 LBS | SYSTOLIC BLOOD PRESSURE: 132 MMHG | DIASTOLIC BLOOD PRESSURE: 76 MMHG | BODY MASS INDEX: 26.68 KG/M2 | HEIGHT: 67 IN

## 2019-06-17 PROCEDURE — 99213 OFFICE O/P EST LOW 20 MIN: CPT

## 2019-06-18 ENCOUNTER — OTHER (OUTPATIENT)
Age: 64
End: 2019-06-18

## 2019-06-24 ENCOUNTER — OUTPATIENT (OUTPATIENT)
Dept: OUTPATIENT SERVICES | Facility: HOSPITAL | Age: 64
LOS: 1 days | End: 2019-06-24
Payer: COMMERCIAL

## 2019-06-24 VITALS
SYSTOLIC BLOOD PRESSURE: 125 MMHG | HEIGHT: 67.5 IN | OXYGEN SATURATION: 99 % | DIASTOLIC BLOOD PRESSURE: 85 MMHG | HEART RATE: 86 BPM | TEMPERATURE: 98 F | WEIGHT: 169.98 LBS

## 2019-06-24 DIAGNOSIS — N85.2 HYPERTROPHY OF UTERUS: ICD-10-CM

## 2019-06-24 DIAGNOSIS — Z98.89 OTHER SPECIFIED POSTPROCEDURAL STATES: Chronic | ICD-10-CM

## 2019-06-24 DIAGNOSIS — Z01.818 ENCOUNTER FOR OTHER PREPROCEDURAL EXAMINATION: ICD-10-CM

## 2019-06-24 DIAGNOSIS — R93.89 ABNORMAL FINDINGS ON DIAGNOSTIC IMAGING OF OTHER SPECIFIED BODY STRUCTURES: ICD-10-CM

## 2019-06-24 DIAGNOSIS — Z98.890 OTHER SPECIFIED POSTPROCEDURAL STATES: Chronic | ICD-10-CM

## 2019-06-24 LAB
HCT VFR BLD CALC: 39.2 % — SIGNIFICANT CHANGE UP (ref 34.5–45)
HGB BLD-MCNC: 12.6 G/DL — SIGNIFICANT CHANGE UP (ref 11.5–15.5)
MCHC RBC-ENTMCNC: 27.2 PG — SIGNIFICANT CHANGE UP (ref 27–34)
MCHC RBC-ENTMCNC: 32.1 GM/DL — SIGNIFICANT CHANGE UP (ref 32–36)
MCV RBC AUTO: 84.7 FL — SIGNIFICANT CHANGE UP (ref 80–100)
PLATELET # BLD AUTO: 291 K/UL — SIGNIFICANT CHANGE UP (ref 150–400)
RBC # BLD: 4.63 M/UL — SIGNIFICANT CHANGE UP (ref 3.8–5.2)
RBC # FLD: 13.1 % — SIGNIFICANT CHANGE UP (ref 10.3–14.5)
WBC # BLD: 8.37 K/UL — SIGNIFICANT CHANGE UP (ref 3.8–10.5)
WBC # FLD AUTO: 8.37 K/UL — SIGNIFICANT CHANGE UP (ref 3.8–10.5)

## 2019-06-24 PROCEDURE — G0463: CPT

## 2019-06-24 PROCEDURE — 85027 COMPLETE CBC AUTOMATED: CPT

## 2019-06-24 RX ORDER — LIDOCAINE HCL 20 MG/ML
0.2 VIAL (ML) INJECTION ONCE
Refills: 0 | Status: DISCONTINUED | OUTPATIENT
Start: 2019-07-03 | End: 2019-07-18

## 2019-06-24 RX ORDER — SODIUM CHLORIDE 9 MG/ML
3 INJECTION INTRAMUSCULAR; INTRAVENOUS; SUBCUTANEOUS EVERY 8 HOURS
Refills: 0 | Status: DISCONTINUED | OUTPATIENT
Start: 2019-07-03 | End: 2019-07-18

## 2019-06-24 NOTE — H&P PST ADULT - NSICDXPROBLEM_GEN_ALL_CORE_FT
PROBLEM DIAGNOSES  Problem: Uterine hyperplasia  Assessment and Plan: Diagnostic hysteroscopy with ultrasound guidance, dilation and curettage

## 2019-06-24 NOTE — H&P PST ADULT - ATTENDING COMMENTS
Seen in SDA. planned procedure discussed. Instruction (incl B, VD, and pain mgmt reviewed). All Q/A. Consent reviewed. D/W Anesth attd.

## 2019-06-24 NOTE — H&P PST ADULT - NSICDXFAMILYHX_GEN_ALL_CORE_FT
FAMILY HISTORY:  Mother  Still living? Unknown  Family history of breast cancer in mother, Age at diagnosis: Age Unknown

## 2019-06-24 NOTE — H&P PST ADULT - NSICDXPASTSURGICALHX_GEN_ALL_CORE_FT
PAST SURGICAL HISTORY:  H/O lumpectomy right breast ~ Feb. 11, 2014 and axillary node dissection    S/P hernia surgery

## 2019-06-24 NOTE — H&P PST ADULT - HISTORY OF PRESENT ILLNESS
63 y/o F PMH uterine leiomyoma, hyperplasia of the uterine lining, S/P previous D+Cs.  Presents today for diagnostic hysteroscopy with ultrasound guidance, dilation and curettage.

## 2019-06-24 NOTE — H&P PST ADULT - NSICDXPASTMEDICALHX_GEN_ALL_CORE_FT
PAST MEDICAL HISTORY:  Breast cancer s/p 8 weeks radiation therapy, s/p R lumpectomy in 2014    Ventral hernia without obstruction or gangrene

## 2019-07-02 ENCOUNTER — TRANSCRIPTION ENCOUNTER (OUTPATIENT)
Age: 64
End: 2019-07-02

## 2019-07-03 ENCOUNTER — OUTPATIENT (OUTPATIENT)
Dept: OUTPATIENT SERVICES | Facility: HOSPITAL | Age: 64
LOS: 1 days | End: 2019-07-03
Payer: COMMERCIAL

## 2019-07-03 ENCOUNTER — APPOINTMENT (OUTPATIENT)
Dept: ULTRASOUND IMAGING | Facility: HOSPITAL | Age: 64
End: 2019-07-03

## 2019-07-03 ENCOUNTER — APPOINTMENT (OUTPATIENT)
Dept: GYNECOLOGIC ONCOLOGY | Facility: HOSPITAL | Age: 64
End: 2019-07-03

## 2019-07-03 ENCOUNTER — RESULT REVIEW (OUTPATIENT)
Age: 64
End: 2019-07-03

## 2019-07-03 VITALS
DIASTOLIC BLOOD PRESSURE: 70 MMHG | RESPIRATION RATE: 16 BRPM | TEMPERATURE: 98 F | OXYGEN SATURATION: 100 % | SYSTOLIC BLOOD PRESSURE: 134 MMHG | HEART RATE: 76 BPM

## 2019-07-03 VITALS
SYSTOLIC BLOOD PRESSURE: 131 MMHG | OXYGEN SATURATION: 100 % | DIASTOLIC BLOOD PRESSURE: 73 MMHG | HEART RATE: 85 BPM | RESPIRATION RATE: 20 BRPM | WEIGHT: 169.98 LBS | TEMPERATURE: 98 F | HEIGHT: 67.5 IN

## 2019-07-03 DIAGNOSIS — Z98.89 OTHER SPECIFIED POSTPROCEDURAL STATES: Chronic | ICD-10-CM

## 2019-07-03 DIAGNOSIS — Z98.890 OTHER SPECIFIED POSTPROCEDURAL STATES: Chronic | ICD-10-CM

## 2019-07-03 DIAGNOSIS — R93.89 ABNORMAL FINDINGS ON DIAGNOSTIC IMAGING OF OTHER SPECIFIED BODY STRUCTURES: ICD-10-CM

## 2019-07-03 PROCEDURE — 58120 DILATION AND CURETTAGE: CPT

## 2019-07-03 PROCEDURE — 76998 US GUIDE INTRAOP: CPT

## 2019-07-03 PROCEDURE — 58558 HYSTEROSCOPY BIOPSY: CPT

## 2019-07-03 PROCEDURE — 88305 TISSUE EXAM BY PATHOLOGIST: CPT | Mod: 26

## 2019-07-03 PROCEDURE — 88305 TISSUE EXAM BY PATHOLOGIST: CPT

## 2019-07-03 PROCEDURE — 58555 HYSTEROSCOPY DX SEP PROC: CPT

## 2019-07-03 RX ORDER — SODIUM CHLORIDE 9 MG/ML
1000 INJECTION, SOLUTION INTRAVENOUS
Refills: 0 | Status: DISCONTINUED | OUTPATIENT
Start: 2019-07-03 | End: 2019-07-18

## 2019-07-03 RX ORDER — ONDANSETRON 8 MG/1
4 TABLET, FILM COATED ORAL ONCE
Refills: 0 | Status: DISCONTINUED | OUTPATIENT
Start: 2019-07-03 | End: 2019-07-18

## 2019-07-03 RX ORDER — CELECOXIB 200 MG/1
200 CAPSULE ORAL ONCE
Refills: 0 | Status: COMPLETED | OUTPATIENT
Start: 2019-07-03 | End: 2019-07-03

## 2019-07-03 RX ORDER — CELECOXIB 200 MG/1
200 CAPSULE ORAL ONCE
Refills: 0 | Status: DISCONTINUED | OUTPATIENT
Start: 2019-07-03 | End: 2019-07-18

## 2019-07-03 RX ORDER — ACETAMINOPHEN 500 MG
1000 TABLET ORAL ONCE
Refills: 0 | Status: COMPLETED | OUTPATIENT
Start: 2019-07-03 | End: 2019-07-03

## 2019-07-03 RX ORDER — OXYCODONE HYDROCHLORIDE 5 MG/1
5 TABLET ORAL ONCE
Refills: 0 | Status: DISCONTINUED | OUTPATIENT
Start: 2019-07-03 | End: 2019-07-03

## 2019-07-03 RX ORDER — HYDROMORPHONE HYDROCHLORIDE 2 MG/ML
0.25 INJECTION INTRAMUSCULAR; INTRAVENOUS; SUBCUTANEOUS
Refills: 0 | Status: DISCONTINUED | OUTPATIENT
Start: 2019-07-03 | End: 2019-07-03

## 2019-07-03 RX ADMIN — CELECOXIB 200 MILLIGRAM(S): 200 CAPSULE ORAL at 10:46

## 2019-07-03 RX ADMIN — Medication 1000 MILLIGRAM(S): at 10:46

## 2019-07-03 NOTE — BRIEF OPERATIVE NOTE - NSICDXBRIEFPROCEDURE_GEN_ALL_CORE_FT
PROCEDURES:  Hysteroscopy with dilation and curettage of uterus 03-Jul-2019 13:04:45  Ian Patel normal...

## 2019-07-03 NOTE — ASU DISCHARGE PLAN (ADULT/PEDIATRIC) - CALL YOUR DOCTOR IF YOU HAVE ANY OF THE FOLLOWING:
Fever greater than (need to indicate Fahrenheit or Celsius)/Unable to urinate/Bleeding that does not stop/Pain not relieved by Medications

## 2019-07-03 NOTE — BRIEF OPERATIVE NOTE - OPERATION/FINDINGS
Sono throughout.  Sound to 8.5 cm mid-RV.   ? post wall of uterus with submucous myoma  Thickened lining pre curettage and apparently thin post curettage per the sono tech. No FF.

## 2019-07-03 NOTE — ASU DISCHARGE PLAN (ADULT/PEDIATRIC) - CARE PROVIDER_API CALL
Ina Patel (MD)  Gynecologic Oncology; Obstetrics and Gynecology  [address] [facility], [address] [place] [address]  Phone: [phone]  Fax: [phone]  Follow Up Time:

## 2019-07-16 ENCOUNTER — APPOINTMENT (OUTPATIENT)
Dept: GYNECOLOGIC ONCOLOGY | Facility: CLINIC | Age: 64
End: 2019-07-16
Payer: MEDICAID

## 2019-07-16 VITALS — SYSTOLIC BLOOD PRESSURE: 127 MMHG | DIASTOLIC BLOOD PRESSURE: 76 MMHG | HEIGHT: 67 IN | TEMPERATURE: 97.6 F

## 2019-07-16 PROCEDURE — 99213 OFFICE O/P EST LOW 20 MIN: CPT

## 2019-07-16 NOTE — HISTORY OF PRESENT ILLNESS
[FreeTextEntry1] : GYN: Dr. Juan Lewis\par PCP: Dr. Mainor Arnett\par GI: Dr. Ron Carreon\par Breast Surgeon: Dr. Pranav Irving\par Med/Onc: Dr. Ng\par \par She was diagnosed in 2013 with  ER/OH (+) Breast Cancer, is s/p right lumpectomy followed by RT, and started on Tamoxifen.  She has seen Dr. Rodriguez and Dr. Patel for thickened endometrial lining.  She opted to stay on Tamoxifen.  \par \par D&C with Dr. Patel was in June of 2018, and was negative with superficial strips of inactive endometrium.\par \par Patient finished Tamoxifen treatment in May 2019.  She called on 5/17/19 with complaints of light VB.  She was advised to follow up in our office. \par \par Int Hx: EM Bx: atrophic EM\par \par 7/3/19- D&C/HSC - scant fragments of inactive endometrial tissue with possible breakdown changes, benign squamous epithelium  \par \par She is here for postop visit.

## 2019-07-16 NOTE — PHYSICAL EXAM
[Normal] : Anus and perineum: Normal sphincter tone, no masses, no prolapse. [Fully active, able to carry on all pre-disease performance without restriction] : Status 0 - Fully active, able to carry on all pre-disease performance without restriction [de-identified] : deferred [de-identified] : nonpalpable [de-identified] : deferred

## 2019-07-17 LAB — HPV HIGH+LOW RISK DNA PNL CVX: NOT DETECTED

## 2019-07-24 LAB — CYTOLOGY CVX/VAG DOC THIN PREP: NORMAL

## 2019-08-20 ENCOUNTER — OUTPATIENT (OUTPATIENT)
Dept: OUTPATIENT SERVICES | Facility: HOSPITAL | Age: 64
LOS: 1 days | Discharge: HOME | End: 2019-08-20

## 2019-08-20 DIAGNOSIS — C50.919 MALIGNANT NEOPLASM OF UNSPECIFIED SITE OF UNSPECIFIED FEMALE BREAST: ICD-10-CM

## 2019-08-20 DIAGNOSIS — Z98.89 OTHER SPECIFIED POSTPROCEDURAL STATES: Chronic | ICD-10-CM

## 2019-08-20 DIAGNOSIS — Z98.890 OTHER SPECIFIED POSTPROCEDURAL STATES: Chronic | ICD-10-CM

## 2019-08-21 ENCOUNTER — APPOINTMENT (OUTPATIENT)
Dept: HEMATOLOGY ONCOLOGY | Facility: CLINIC | Age: 64
End: 2019-08-21
Payer: MEDICAID

## 2019-08-21 VITALS
DIASTOLIC BLOOD PRESSURE: 77 MMHG | OXYGEN SATURATION: 97 % | SYSTOLIC BLOOD PRESSURE: 120 MMHG | BODY MASS INDEX: 27.45 KG/M2 | HEART RATE: 80 BPM | TEMPERATURE: 98.1 F | WEIGHT: 175.26 LBS | RESPIRATION RATE: 18 BRPM

## 2019-08-21 PROCEDURE — 99213 OFFICE O/P EST LOW 20 MIN: CPT

## 2019-08-21 NOTE — PHYSICAL EXAM
[Fully active, able to carry on all pre-disease performance without restriction] : Status 0 - Fully active, able to carry on all pre-disease performance without restriction [Normal] : affect appropriate [de-identified] : right lumpectomy scar; no recurrence

## 2019-08-21 NOTE — ASSESSMENT
[Curative] : Goals of care discussed with patient: Curative [Palliative Care Plan] : not applicable at this time [FreeTextEntry1] : 63 year old null parous female who developed a low grade T1CN0 right breast cancer ER+ treated with lumpectomy, radiation, and tamoxifen. She has almost completed 5 years of hormonal adjuvant therapy. As the lesion is low grade and small, stop tamoxifen at 5 years. Nothing to suggest a genetic syndrome. VERÓNICA/stable.\par \par Plan:\par 1) get repeat mammogram\par 2) finished tamoxifen il May 2019\par 3) baseline CMP\par 4) yearly followup\par

## 2019-10-20 NOTE — ASU PATIENT PROFILE, ADULT - PAIN CHRONIC, PROFILE
no
LABS:                        13.9   6.49  )-----------( 114      ( 20 Oct 2019 14:47 )             42.4     10-20    140  |  104  |  24<H>  ----------------------------<  290<H>  4.0   |  25  |  0.78    Ca    8.6      20 Oct 2019 14:47    TPro  6.9  /  Alb  3.8  /  TBili  0.4  /  DBili  x   /  AST  16  /  ALT  16  /  AlkPhos  66  10-20    CAPILLARY BLOOD GLUCOSE    RADIOLOGY & ADDITIONAL TESTS:  CT Abdomen and Pelvis w/ IV Cont (10.20.19 @ 17:32)  ****PRELIMINARY REPORT******        FINDINGS:  Liver: Normal. No mass.  Gallbladder and bile ducts: Normal. No calcified stones. No ductal   dilation.  Pancreas: Normal. No ductal dilation.  Spleen: Normal. No splenomegaly.  Adrenals: Normal.No mass.  Kidneys and ureters: 3.7 cm left renal cyst, otherwise normal kidneys.  Stomach and bowel: Unremarkable. No obstruction. No mucosal thickening.  Appendix: No evidence of appendicitis.  Intraperitoneal space: Unremarkable. No free air. No significant fluid   collection.  Vasculature: Unremarkable. No abdominal aortic aneurysm.  Lymph nodes: Unremarkable. No enlarged lymph nodes.  Bladder: Unremarkable as visualized.  Reproductive: Unremarkable as visualized.  Bones/joints: Unremarkable. No acute fracture.  Soft tissues: Unremarkable.    IMPRESSION:  No acute traumatic injury abdomen/pelvis.    CT Chest w/ IV Cont (10.20.19 @ 17:32)  ****PRELIMINARY REPORT*****  FINDINGS:  Lungs: Small focal nodular density within the right upper lobe   posteriorly measuring 12 mm.  Pleural space: Unremarkable. No pneumothorax. No pleural effusion.  Heart: Unremarkable. No cardiomegaly. No pericardial effusion.  Aorta: Unremarkable. No aortic aneurysm.  Lymph nodes: Unremarkable. No enlarged lymph nodes.  Bones/joints: Unremarkable. No acute fracture.  Soft tissues: Unremarkable.  IMPRESSION:  1. No acute traumatic injury chest.  2. Small focal nodular density right upper lobe posteriorly measuring 12   mm, indeterminate. For both  low risk and high risk patients, consider CT at 3 months, PET/CT or   biopsy. (Zulema et al.,  Fleischner Society, 2017)    CT Cervical Spine No Cont (10.20.19 @ 17:31)  IMPRESSION:   No fracture.  Degenerative changes    CT Head No Cont (10.20.19 @ 17:31)   IMPRESSION:   No acute intracranial hemorrhage or calvarial fracture. Small vessel   ischemic changes and left inferior frontal lobe encephalomalacia. No   significant change from 7/28/2019.

## 2019-11-20 ENCOUNTER — FORM ENCOUNTER (OUTPATIENT)
Age: 64
End: 2019-11-20

## 2019-11-21 ENCOUNTER — OUTPATIENT (OUTPATIENT)
Dept: OUTPATIENT SERVICES | Facility: HOSPITAL | Age: 64
LOS: 1 days | End: 2019-11-21
Payer: COMMERCIAL

## 2019-11-21 ENCOUNTER — APPOINTMENT (OUTPATIENT)
Dept: MAMMOGRAPHY | Facility: CLINIC | Age: 64
End: 2019-11-21
Payer: MEDICAID

## 2019-11-21 ENCOUNTER — APPOINTMENT (OUTPATIENT)
Dept: ULTRASOUND IMAGING | Facility: CLINIC | Age: 64
End: 2019-11-21
Payer: MEDICAID

## 2019-11-21 DIAGNOSIS — Z98.89 OTHER SPECIFIED POSTPROCEDURAL STATES: Chronic | ICD-10-CM

## 2019-11-21 DIAGNOSIS — Z00.8 ENCOUNTER FOR OTHER GENERAL EXAMINATION: ICD-10-CM

## 2019-11-21 DIAGNOSIS — Z98.890 OTHER SPECIFIED POSTPROCEDURAL STATES: Chronic | ICD-10-CM

## 2019-11-21 PROCEDURE — 77066 DX MAMMO INCL CAD BI: CPT

## 2019-11-21 PROCEDURE — 77066 DX MAMMO INCL CAD BI: CPT | Mod: 26

## 2019-11-21 PROCEDURE — 76641 ULTRASOUND BREAST COMPLETE: CPT

## 2019-11-21 PROCEDURE — 76641 ULTRASOUND BREAST COMPLETE: CPT | Mod: 26,50

## 2019-11-21 PROCEDURE — G0279: CPT | Mod: 26

## 2019-11-21 PROCEDURE — G0279: CPT

## 2019-11-29 ENCOUNTER — APPOINTMENT (OUTPATIENT)
Dept: MRI IMAGING | Facility: CLINIC | Age: 64
End: 2019-11-29
Payer: MEDICAID

## 2019-12-09 ENCOUNTER — APPOINTMENT (OUTPATIENT)
Dept: MRI IMAGING | Facility: CLINIC | Age: 64
End: 2019-12-09
Payer: MEDICAID

## 2019-12-09 ENCOUNTER — OUTPATIENT (OUTPATIENT)
Dept: OUTPATIENT SERVICES | Facility: HOSPITAL | Age: 64
LOS: 1 days | End: 2019-12-09
Payer: COMMERCIAL

## 2019-12-09 DIAGNOSIS — Z00.8 ENCOUNTER FOR OTHER GENERAL EXAMINATION: ICD-10-CM

## 2019-12-09 DIAGNOSIS — Z98.89 OTHER SPECIFIED POSTPROCEDURAL STATES: Chronic | ICD-10-CM

## 2019-12-09 DIAGNOSIS — Z98.890 OTHER SPECIFIED POSTPROCEDURAL STATES: Chronic | ICD-10-CM

## 2019-12-09 PROCEDURE — C8937: CPT

## 2019-12-09 PROCEDURE — 77049 MRI BREAST C-+ W/CAD BI: CPT | Mod: 26

## 2019-12-09 PROCEDURE — A9585: CPT

## 2019-12-09 PROCEDURE — C8908: CPT

## 2020-03-02 NOTE — PRE-ANESTHESIA EVALUATION ADULT - NS MD HP INPLANTS MED DEV
Preventive Health Recommendations  Female Ages 40 to 49    Yearly exam:     See your health care provider every year in order to  1. Review health changes.   2. Discuss preventive care.    3. Review your medicines if your doctor prescribed any.      Get a Pap test every three years (unless you have an abnormal result and your provider advises testing more often).      If you get Pap tests with HPV test, you only need to test every 5 years, unless you have an abnormal result. You do not need a Pap test if your uterus was removed (hysterectomy) and you have not had cancer.      You should be tested each year for STDs (sexually transmitted diseases), if you're at risk.     Ask your doctor if you should have a mammogram.      Have a colonoscopy (test for colon cancer) if someone in your family has had colon cancer or polyps before age 50.       Have a cholesterol test every 5 years.       Have a diabetes test (fasting glucose) after age 45. If you are at risk for diabetes, you should have this test every 3 years.    Shots: Get a flu shot each year. Get a tetanus shot every 10 years.     Nutrition:     Eat at least 5 servings of fruits and vegetables each day.    Eat whole-grain bread, whole-wheat pasta and brown rice instead of white grains and rice.    Get adequate Calcium and Vitamin D.      Lifestyle    Exercise at least 150 minutes a week (an average of 30 minutes a day, 5 days a week). This will help you control your weight and prevent disease.    Limit alcohol to one drink per day.    No smoking.     Wear sunscreen to prevent skin cancer.    See your dentist every six months for an exam and cleaning.  
None

## 2020-06-01 ENCOUNTER — OUTPATIENT (OUTPATIENT)
Dept: OUTPATIENT SERVICES | Facility: HOSPITAL | Age: 65
LOS: 1 days | Discharge: HOME | End: 2020-06-01

## 2020-06-01 DIAGNOSIS — Z98.89 OTHER SPECIFIED POSTPROCEDURAL STATES: Chronic | ICD-10-CM

## 2020-06-01 DIAGNOSIS — Z98.890 OTHER SPECIFIED POSTPROCEDURAL STATES: Chronic | ICD-10-CM

## 2020-06-01 DIAGNOSIS — C50.919 MALIGNANT NEOPLASM OF UNSPECIFIED SITE OF UNSPECIFIED FEMALE BREAST: ICD-10-CM

## 2020-06-02 ENCOUNTER — APPOINTMENT (OUTPATIENT)
Dept: HEMATOLOGY ONCOLOGY | Facility: CLINIC | Age: 65
End: 2020-06-02
Payer: MEDICAID

## 2020-06-02 PROCEDURE — 99213 OFFICE O/P EST LOW 20 MIN: CPT | Mod: 95

## 2020-08-20 ENCOUNTER — APPOINTMENT (OUTPATIENT)
Dept: INTERNAL MEDICINE | Facility: CLINIC | Age: 65
End: 2020-08-20
Payer: MEDICAID

## 2020-08-20 ENCOUNTER — LABORATORY RESULT (OUTPATIENT)
Age: 65
End: 2020-08-20

## 2020-08-20 VITALS — SYSTOLIC BLOOD PRESSURE: 124 MMHG | RESPIRATION RATE: 14 BRPM | DIASTOLIC BLOOD PRESSURE: 74 MMHG | HEART RATE: 74 BPM

## 2020-08-20 VITALS — BODY MASS INDEX: 27.62 KG/M2 | HEIGHT: 67 IN | WEIGHT: 176 LBS

## 2020-08-20 PROCEDURE — 99203 OFFICE O/P NEW LOW 30 MIN: CPT | Mod: 25

## 2020-08-20 PROCEDURE — 36415 COLL VENOUS BLD VENIPUNCTURE: CPT

## 2020-08-20 NOTE — ASSESSMENT
[FreeTextEntry1] : Abd pain x 2 wks.  No distress.  Appears very comfortable.  No wt loss or change in appetite. Some hx if IBS with D.  Had diarrhea 3 days ago.  Mild change in stool caliber??  Colonoscopy 2017 neg.  had D & C 7-2019 that was neg.  Doubt UTI.  Consider diverticulitis.  Doubt colonic or Uterine pathology.  Declines laxative. \par Start with labs and consider US or CT abd.  \par Breast CA Right.  s/p RT and tamoxifen x 5 years.  Ended 5-11-19\par Cataract and s/p IOL R eye felt due to Tamoxifen.

## 2020-08-20 NOTE — HEALTH RISK ASSESSMENT
[No] : In the past 12 months have you used drugs other than those required for medical reasons? No [No falls in past year] : Patient reported no falls in the past year [0] : 1) Little interest or pleasure doing things: Not at all (0) [] : No [Audit-CScore] : 0 [[id number]] : 0

## 2020-08-20 NOTE — REVIEW OF SYSTEMS
[Constipation] : constipation [Abdominal Pain] : abdominal pain [Diarrhea] : diarrhea [Negative] : Musculoskeletal [Fever] : no fever [Chest Pain] : no chest pain [Palpitations] : no palpitations [Lower Ext Edema] : no lower extremity edema [Shortness Of Breath] : no shortness of breath [Wheezing] : no wheezing [Cough] : no cough [Dyspnea on Exertion] : no dyspnea on exertion [Vomiting] : no vomiting [Nausea] : no nausea [Melena] : no melena [Headache] : no headache [Heartburn] : no heartburn [Suicidal] : not suicidal [Dizziness] : no dizziness

## 2020-08-20 NOTE — PHYSICAL EXAM
[Pedal Pulses Present] : the pedal pulses are present [No Edema] : there was no peripheral edema [Soft] : abdomen soft [Non-distended] : non-distended [Non Tender] : non-tender [No Masses] : no abdominal mass palpated [Normal Bowel Sounds] : normal bowel sounds [No CVA Tenderness] : no CVA  tenderness [Normal] : no rash [de-identified] : Scar L radial wrist due to lipoma excision.

## 2020-08-20 NOTE — HISTORY OF PRESENT ILLNESS
[FreeTextEntry8] : 2 wks lower abd "no feeling and hardness.  Pointing to suprapubic area.  Occas long hx of loose BMs.  Stool recently 'rounder and hard"  No melena or BRBPR.  Urinating no change.  No burning of blood.  No change in appetite.  No nausea vomiting.  \par Takes only Vit D.  No fever chils.  \par Colonoscopy 2017 neg Dr Carreon in [place].  \par Pelvic exam 1 yr ago and had D & C benign.

## 2020-08-21 DIAGNOSIS — R10.9 UNSPECIFIED ABDOMINAL PAIN: ICD-10-CM

## 2020-08-21 LAB
25(OH)D3 SERPL-MCNC: 47.3 NG/ML
ALBUMIN SERPL ELPH-MCNC: 4.7 G/DL
ALP BLD-CCNC: 92 U/L
ALT SERPL-CCNC: 12 U/L
ANION GAP SERPL CALC-SCNC: 17 MMOL/L
APPEARANCE: ABNORMAL
AST SERPL-CCNC: 15 U/L
BASOPHILS # BLD AUTO: 0.04 K/UL
BASOPHILS NFR BLD AUTO: 0.4 %
BILIRUB SERPL-MCNC: 0.2 MG/DL
BILIRUBIN URINE: NEGATIVE
BLOOD URINE: NORMAL
BUN SERPL-MCNC: 19 MG/DL
CALCIUM SERPL-MCNC: 10 MG/DL
CHLORIDE SERPL-SCNC: 100 MMOL/L
CHOLEST SERPL-MCNC: 260 MG/DL
CHOLEST/HDLC SERPL: 5.4 RATIO
CO2 SERPL-SCNC: 23 MMOL/L
COLOR: YELLOW
CREAT SERPL-MCNC: 1.17 MG/DL
EOSINOPHIL # BLD AUTO: 0.14 K/UL
EOSINOPHIL NFR BLD AUTO: 1.5 %
ESTIMATED AVERAGE GLUCOSE: 108 MG/DL
GLUCOSE QUALITATIVE U: NEGATIVE
GLUCOSE SERPL-MCNC: 93 MG/DL
HBA1C MFR BLD HPLC: 5.4 %
HCT VFR BLD CALC: 43.7 %
HDLC SERPL-MCNC: 48 MG/DL
HGB BLD-MCNC: 13.8 G/DL
IMM GRANULOCYTES NFR BLD AUTO: 1.5 %
KETONES URINE: NEGATIVE
LDLC SERPL CALC-MCNC: 170 MG/DL
LEUKOCYTE ESTERASE URINE: NEGATIVE
LYMPHOCYTES # BLD AUTO: 3.27 K/UL
LYMPHOCYTES NFR BLD AUTO: 35.5 %
MAN DIFF?: NORMAL
MCHC RBC-ENTMCNC: 27.2 PG
MCHC RBC-ENTMCNC: 31.6 GM/DL
MCV RBC AUTO: 86 FL
MONOCYTES # BLD AUTO: 0.65 K/UL
MONOCYTES NFR BLD AUTO: 7.1 %
NEUTROPHILS # BLD AUTO: 4.96 K/UL
NEUTROPHILS NFR BLD AUTO: 54 %
NITRITE URINE: NEGATIVE
PH URINE: 6
PLATELET # BLD AUTO: 328 K/UL
POTASSIUM SERPL-SCNC: 5.2 MMOL/L
PROT SERPL-MCNC: 7.5 G/DL
PROTEIN URINE: NEGATIVE
RBC # BLD: 5.08 M/UL
RBC # FLD: 12.9 %
SODIUM SERPL-SCNC: 141 MMOL/L
SPECIFIC GRAVITY URINE: >=1.03
TRIGL SERPL-MCNC: 207 MG/DL
TSH SERPL-ACNC: 0.52 UIU/ML
UROBILINOGEN URINE: NORMAL
WBC # FLD AUTO: 9.2 K/UL

## 2020-08-24 ENCOUNTER — RESULT REVIEW (OUTPATIENT)
Age: 65
End: 2020-08-24

## 2020-08-24 ENCOUNTER — OUTPATIENT (OUTPATIENT)
Dept: OUTPATIENT SERVICES | Facility: HOSPITAL | Age: 65
LOS: 1 days | End: 2020-08-24
Payer: COMMERCIAL

## 2020-08-24 ENCOUNTER — APPOINTMENT (OUTPATIENT)
Dept: CT IMAGING | Facility: CLINIC | Age: 65
End: 2020-08-24
Payer: MEDICAID

## 2020-08-24 DIAGNOSIS — Z98.890 OTHER SPECIFIED POSTPROCEDURAL STATES: Chronic | ICD-10-CM

## 2020-08-24 DIAGNOSIS — R10.9 UNSPECIFIED ABDOMINAL PAIN: ICD-10-CM

## 2020-08-24 DIAGNOSIS — Z98.89 OTHER SPECIFIED POSTPROCEDURAL STATES: Chronic | ICD-10-CM

## 2020-08-24 PROCEDURE — 82565 ASSAY OF CREATININE: CPT

## 2020-08-24 PROCEDURE — 74177 CT ABD & PELVIS W/CONTRAST: CPT

## 2020-08-24 PROCEDURE — 74177 CT ABD & PELVIS W/CONTRAST: CPT | Mod: 26

## 2020-10-19 RX ORDER — CHROMIUM 200 MCG
TABLET ORAL
Refills: 0 | Status: DISCONTINUED | COMMUNITY
End: 2020-10-19

## 2020-11-03 ENCOUNTER — APPOINTMENT (OUTPATIENT)
Dept: INTERNAL MEDICINE | Facility: CLINIC | Age: 65
End: 2020-11-03
Payer: MEDICAID

## 2020-11-03 ENCOUNTER — APPOINTMENT (OUTPATIENT)
Dept: RADIOLOGY | Facility: IMAGING CENTER | Age: 65
End: 2020-11-03
Payer: MEDICAID

## 2020-11-03 ENCOUNTER — OUTPATIENT (OUTPATIENT)
Dept: OUTPATIENT SERVICES | Facility: HOSPITAL | Age: 65
LOS: 1 days | End: 2020-11-03
Payer: COMMERCIAL

## 2020-11-03 VITALS — BODY MASS INDEX: 29.66 KG/M2 | WEIGHT: 189 LBS | HEIGHT: 67 IN

## 2020-11-03 VITALS — DIASTOLIC BLOOD PRESSURE: 70 MMHG | SYSTOLIC BLOOD PRESSURE: 120 MMHG | HEART RATE: 75 BPM | RESPIRATION RATE: 14 BRPM

## 2020-11-03 DIAGNOSIS — S69.92XA UNSPECIFIED INJURY OF LEFT WRIST, HAND AND FINGER(S), INITIAL ENCOUNTER: ICD-10-CM

## 2020-11-03 DIAGNOSIS — H01.9 UNSPECIFIED INFLAMMATION OF EYELID: ICD-10-CM

## 2020-11-03 DIAGNOSIS — Z98.890 OTHER SPECIFIED POSTPROCEDURAL STATES: Chronic | ICD-10-CM

## 2020-11-03 DIAGNOSIS — Z98.89 OTHER SPECIFIED POSTPROCEDURAL STATES: Chronic | ICD-10-CM

## 2020-11-03 DIAGNOSIS — S69.91XA UNSPECIFIED INJURY OF RIGHT WRIST, HAND AND FINGER(S), INITIAL ENCOUNTER: ICD-10-CM

## 2020-11-03 PROCEDURE — 73120 X-RAY EXAM OF HAND: CPT | Mod: 26,LT,76

## 2020-11-03 PROCEDURE — 73120 X-RAY EXAM OF HAND: CPT

## 2020-11-03 PROCEDURE — 99072 ADDL SUPL MATRL&STAF TM PHE: CPT

## 2020-11-03 PROCEDURE — 99213 OFFICE O/P EST LOW 20 MIN: CPT

## 2020-11-03 RX ORDER — MULTIVIT-MIN/FOLIC/VIT K/LYCOP 400-300MCG
25 MCG TABLET ORAL
Qty: 90 | Refills: 3 | Status: DISCONTINUED | COMMUNITY
Start: 2020-10-19 | End: 2020-11-03

## 2020-11-03 RX ORDER — MOMETASONE FUROATE 1 MG/G
0.1 CREAM TOPICAL TWICE DAILY
Qty: 60 | Refills: 0 | Status: DISCONTINUED | COMMUNITY
Start: 2018-05-24 | End: 2020-11-03

## 2020-11-03 RX ORDER — PERMETHRIN 50 MG/G
5 CREAM TOPICAL
Qty: 120 | Refills: 0 | Status: COMPLETED | COMMUNITY
Start: 2020-05-07

## 2020-11-03 RX ORDER — MUPIROCIN 20 MG/G
2 OINTMENT TOPICAL
Qty: 44 | Refills: 0 | Status: COMPLETED | COMMUNITY
Start: 2020-05-26

## 2020-11-03 RX ORDER — TRIAMCINOLONE ACETONIDE 0.25 MG/G
0.03 CREAM TOPICAL
Qty: 60 | Refills: 0 | Status: COMPLETED | COMMUNITY
Start: 2020-07-06

## 2020-11-03 RX ORDER — METHYLPREDNISOLONE 4 MG/1
4 TABLET ORAL
Qty: 21 | Refills: 0 | Status: COMPLETED | COMMUNITY
Start: 2020-05-07

## 2020-11-03 NOTE — REVIEW OF SYSTEMS
[Fever] : no fever [Chest Pain] : no chest pain [Palpitations] : no palpitations [Lower Ext Edema] : no lower extremity edema [Shortness Of Breath] : no shortness of breath [Wheezing] : no wheezing [Cough] : no cough [Dyspnea on Exertion] : no dyspnea on exertion [Melena] : no melena [Joint Pain] : joint pain [Joint Stiffness] : joint stiffness [Itching] : Itching [Skin Rash] : skin rash [Headache] : no headache [Dizziness] : no dizziness [Suicidal] : not suicidal [Negative] : Integumentary [FreeTextEntry3] : See HPI

## 2020-11-03 NOTE — ASSESSMENT
[FreeTextEntry1] : Fell 2 wks ago going to basement with laundry.  Still has pain in both 1st MCP areas.  Fx is possible.  Xrays ordered.  COnsider ortho eval.  Cont Tylenol Salon Pas and Icy Hot.\par L upper lid swelling.  Uses hypoallergenic makeup.  Denies self scratching.  SInce she had TAC 0.1 this past summer that was ineffective, will offer TAC 0.5 and observe.  No indic for a PO ABX for this upper eye lid irritation. \par Breast CA Right.  s/p RT and tamoxifen x 5 years.  Ended 5-11-19\par Cataract and s/p IOL R eye felt due to Tamoxifen.

## 2020-11-03 NOTE — PHYSICAL EXAM
[Pedal Pulses Present] : the pedal pulses are present [No Edema] : there was no peripheral edema [Soft] : abdomen soft [Non Tender] : non-tender [Non-distended] : non-distended [No Masses] : no abdominal mass palpated [Normal Bowel Sounds] : normal bowel sounds [No CVA Tenderness] : no CVA  tenderness [Normal] : no rash [de-identified] : L upper lid midl medial swelling and redness.  Conjunctiva normal.   [de-identified] : Bilat hands no swelling redness deformity.  Mild L pronator area resolving discoloration.   [de-identified] : Scar L radial wrist due to lipoma excision.

## 2020-11-03 NOTE — HISTORY OF PRESENT ILLNESS
[FreeTextEntry1] : Fell 2 wks ago carrying laundry to basement.  Fell on both hands.  Using Salon Pas, Icy Hot, Tylenol ES 1000 bid.  Getting relief.  c/o pain when opening jars.  SOme pain at rest when Tylenol and Icy hot wear off.  L pronator wrist ecchymosis resolving.  She knows nothing is broken. Asking for an xray.  \par c/o L upper eye lid irritation for 3-4 days.  Uses only hypoallergenic makeup.  No rubbing or scratching lid.  Used Neosporin on it without benefit.  No eye lid problems except conjunctivitis 3 years ago.

## 2020-11-09 ENCOUNTER — APPOINTMENT (OUTPATIENT)
Dept: ORTHOPEDIC SURGERY | Facility: CLINIC | Age: 65
End: 2020-11-09
Payer: MEDICAID

## 2020-11-09 VITALS — BODY MASS INDEX: 28.25 KG/M2 | WEIGHT: 180 LBS | HEIGHT: 67 IN

## 2020-11-09 PROCEDURE — 99072 ADDL SUPL MATRL&STAF TM PHE: CPT

## 2020-11-09 PROCEDURE — 99204 OFFICE O/P NEW MOD 45 MIN: CPT

## 2020-11-09 NOTE — CONSULT LETTER
[Dear  ___] : Dear  [unfilled], [Consult Letter:] : I had the pleasure of evaluating your patient, [unfilled]. [Please see my note below.] : Please see my note below. [Sincerely,] : Sincerely, [FreeTextEntry2] : PCP [FreeTextEntry3] : Sam Villalobos DO, ATC\par Primary Care Sports Medicine\par [facility]

## 2020-11-09 NOTE — PHYSICAL EXAM
[de-identified] : Constitutional: Well-nourished, well-developed, No acute distress\par Respiratory:  Good respiratory effort, no SOB\par Lymphatic: No regional lymphadenopathy, no lymphedema\par Psychiatric: Pleasant and normal affect, alert and oriented x3\par Skin: Clean dry and intact B/L UE/LE\par Musculoskeletal: normal except where as noted in regional exam\par \par B/L Shoulders:  No asymmetry, malalignment, or swelling, Full ROM, 5/5 strength in flexion/ext, Abd/Add, IR/ER, Joints stable\par B/L Elbows:  No asymmetry, malalignment, or swelling, Full ROM, 5/5 strength in flex/ext, pronation/supination, Joints stable\par B/L wrists: No asymmetry, malalignment, or swelling, Full ROM, 5/5 strength in wrist flexion/ext, and radial/ulnar deviation, Joints stable\par \par Right Hand:\par APPEARANCE: No swelling, no marked deformities or malalignment of the fingers\par POSITIVE TENDERNESS: + 1st CMC joint\par NONTENDER: all other osseous and ligamentous structures. \par ROM: + Crepitus and mild limitation of the first CMC joint, otherwise full & painless in CMC, MCP, and IP joints. \par RESISTIVE TESTING: painless resisted testing. \par SPECIAL TESTS: normal sensation of 1st through 5th digits, normal flex/ext, abd/add, and opposition of thumb, no triggering of fingers\par Vasc: 2+ radial pulse, normal capillary refill\par Neuro: AIN, PIN, Ulnar nerve intact to motor\par Sensation: Intact to light touch throughout\par \par Left Hand:\par APPEARANCE: No swelling, no marked deformities or malalignment of the fingers\par POSITIVE TENDERNESS: + 1st CMC joint\par NONTENDER: all other osseous and ligamentous structures. \par ROM: + Crepitus and mild limitation of the first CMC joint, otherwise full & painless in CMC, MCP, and IP joints. \par RESISTIVE TESTING: painless resisted testing. \par SPECIAL TESTS: normal sensation of 1st through 5th digits, normal flex/ext, abd/add, and opposition of thumb, no triggering of fingers\par Vasc: 2+ radial pulse, normal capillary refill\par Neuro: AIN, PIN, Ulnar nerve intact to motor\par Sensation: Intact to light touch throughout\par  [de-identified] : I reviewed and clinically correlated the following outside imaging studies,\par \par  EXAM: HAND 2VIEWS RT\par \par EXAM: HAND 2VIEWS LT\par \par \par PROCEDURE DATE: 11/03/2020\par \par \par \par INTERPRETATION: CLINICAL INDICATION: fall 2 weeks prior; 1st MCP pain\par \par EXAM:\par Frontal and lateral views of both hands from 11/3/2020 at 1812. No similar prior studies available for comparison.\par \par IMPRESSION:\par Chronic degenerative osseous fragmentation adjacent to radial margin of 2nd DIP joint. Chronic tiny ossicle adjacent to distal left radioulnar joint margin. No dislocations or acute appearing fractures.\par \par Bilateral STT 1st CMC and to greater extent right 2nd DIP joint osteoarthritic change. Preserved remaining joint spaces and no joint margin erosions.\par \par Carpal bones normally aligned.\par \par Generalized osteopenia. Subcortical cystic change in distal right scaphoid pole and right 3rd metacarpal head. No additional focal osseous lesions.\par

## 2020-11-09 NOTE — DISCUSSION/SUMMARY
[de-identified] : Discussed findings of today's exam and possible causes of patient's pain.  Educated patient on their most probable diagnosis of bilateral hand/thumb pain status post fall due to exacerbation of underlying significant CMC osteoarthritis.  Reviewed possible courses of treatment, and we collaboratively decided best course of treatment at this time will include conservative management.  Patient advised to  over-the-counter thumb spica brace to be worn in the evening and as tolerated during the day for support.  Patient has prior side effects to oral aspirin interacting with her tamoxifen causing diffuse swelling, recommend avoidance of oral NSAIDs or prednisone.  Recommend oral Tylenol as needed for pain.  Recommend patient  over-the-counter topical homeopathic anti-inflammatory cream, recommend Topricin to be applied to the area 2–3 times daily.  Patient advised that ice can be very beneficial topically to help decrease swelling.  Patient may also benefit from utilization of paraffin wax bath once she has decreasing pain as more daily maintenance and preventative treatment for CMC osteoarthritis.  We discussed the role of cortisone injections into the CMC joints to decrease patient's acute pain due to exacerbation of underlying osteoarthritis, patient would like to defer at this time.  Follow up as needed.  Patient appreciates and agrees with current plan.\par \par This note was generated using dragon medical dictation software.  A reasonable effort has been made for proofreading its contents, but typos may still remain.  If there are any questions or points of clarification needed please notify my office.

## 2020-11-09 NOTE — HISTORY OF PRESENT ILLNESS
[de-identified] : Patient is here for bilateral hand pain that began about 3 weeks ago after a fall. She went to see her PCP who sent her for xrays that were negative for fracture. She has taken Tylenol, used Salonpas patches and Icy Hot to treat her pain. She has difficulty and increased pain performing tasks with her hands. Denies N/T/R/Prior injury. She is not currently working.\par \par The patient's past medical history, past surgical history, medications and allergies were reviewed by me today and documented accordingly. In addition, the patient's family and social history, which were noncontributory to this visit, were reviewed also. Intake form was reviewed. The patient has no family history of arthritis.

## 2020-11-16 ENCOUNTER — NON-APPOINTMENT (OUTPATIENT)
Age: 65
End: 2020-11-16

## 2020-11-19 ENCOUNTER — NON-APPOINTMENT (OUTPATIENT)
Age: 65
End: 2020-11-19

## 2020-11-23 ENCOUNTER — OUTPATIENT (OUTPATIENT)
Dept: OUTPATIENT SERVICES | Facility: HOSPITAL | Age: 65
LOS: 1 days | End: 2020-11-23
Payer: COMMERCIAL

## 2020-11-23 ENCOUNTER — APPOINTMENT (OUTPATIENT)
Dept: ULTRASOUND IMAGING | Facility: CLINIC | Age: 65
End: 2020-11-23
Payer: MEDICAID

## 2020-11-23 ENCOUNTER — APPOINTMENT (OUTPATIENT)
Dept: MAMMOGRAPHY | Facility: CLINIC | Age: 65
End: 2020-11-23
Payer: MEDICAID

## 2020-11-23 DIAGNOSIS — Z98.890 OTHER SPECIFIED POSTPROCEDURAL STATES: Chronic | ICD-10-CM

## 2020-11-23 DIAGNOSIS — Z00.8 ENCOUNTER FOR OTHER GENERAL EXAMINATION: ICD-10-CM

## 2020-11-23 DIAGNOSIS — Z98.89 OTHER SPECIFIED POSTPROCEDURAL STATES: Chronic | ICD-10-CM

## 2020-11-23 PROCEDURE — 77063 BREAST TOMOSYNTHESIS BI: CPT | Mod: 26

## 2020-11-23 PROCEDURE — 77067 SCR MAMMO BI INCL CAD: CPT | Mod: 26

## 2020-11-23 PROCEDURE — 76641 ULTRASOUND BREAST COMPLETE: CPT | Mod: 26,50

## 2020-11-23 PROCEDURE — 77067 SCR MAMMO BI INCL CAD: CPT

## 2020-11-23 PROCEDURE — 77063 BREAST TOMOSYNTHESIS BI: CPT

## 2020-11-23 PROCEDURE — 76641 ULTRASOUND BREAST COMPLETE: CPT

## 2020-11-27 ENCOUNTER — OUTPATIENT (OUTPATIENT)
Dept: OUTPATIENT SERVICES | Facility: HOSPITAL | Age: 65
LOS: 1 days | Discharge: HOME | End: 2020-11-27

## 2020-11-27 DIAGNOSIS — C50.919 MALIGNANT NEOPLASM OF UNSPECIFIED SITE OF UNSPECIFIED FEMALE BREAST: ICD-10-CM

## 2020-11-27 DIAGNOSIS — Z98.89 OTHER SPECIFIED POSTPROCEDURAL STATES: Chronic | ICD-10-CM

## 2020-11-27 DIAGNOSIS — Z98.890 OTHER SPECIFIED POSTPROCEDURAL STATES: Chronic | ICD-10-CM

## 2020-12-02 ENCOUNTER — APPOINTMENT (OUTPATIENT)
Dept: ULTRASOUND IMAGING | Facility: CLINIC | Age: 65
End: 2020-12-02

## 2020-12-17 ENCOUNTER — APPOINTMENT (OUTPATIENT)
Dept: HEMATOLOGY ONCOLOGY | Facility: CLINIC | Age: 65
End: 2020-12-17
Payer: MEDICAID

## 2020-12-17 VITALS
HEIGHT: 67 IN | BODY MASS INDEX: 28.86 KG/M2 | DIASTOLIC BLOOD PRESSURE: 82 MMHG | OXYGEN SATURATION: 97 % | SYSTOLIC BLOOD PRESSURE: 132 MMHG | WEIGHT: 183.87 LBS | HEART RATE: 82 BPM | TEMPERATURE: 97.2 F | RESPIRATION RATE: 16 BRPM

## 2020-12-17 PROCEDURE — 99214 OFFICE O/P EST MOD 30 MIN: CPT

## 2020-12-17 PROCEDURE — 99072 ADDL SUPL MATRL&STAF TM PHE: CPT

## 2021-01-29 ENCOUNTER — NON-APPOINTMENT (OUTPATIENT)
Age: 66
End: 2021-01-29

## 2021-02-11 ENCOUNTER — APPOINTMENT (OUTPATIENT)
Dept: INTERNAL MEDICINE | Facility: CLINIC | Age: 66
End: 2021-02-11
Payer: MEDICAID

## 2021-02-11 VITALS — SYSTOLIC BLOOD PRESSURE: 120 MMHG | HEART RATE: 74 BPM | RESPIRATION RATE: 14 BRPM | DIASTOLIC BLOOD PRESSURE: 60 MMHG

## 2021-02-11 VITALS — BODY MASS INDEX: 28.25 KG/M2 | HEIGHT: 67 IN | WEIGHT: 180 LBS

## 2021-02-11 PROCEDURE — G0009: CPT

## 2021-02-11 PROCEDURE — 90670 PCV13 VACCINE IM: CPT

## 2021-02-11 PROCEDURE — 99072 ADDL SUPL MATRL&STAF TM PHE: CPT

## 2021-03-01 ENCOUNTER — RX RENEWAL (OUTPATIENT)
Age: 66
End: 2021-03-01

## 2021-05-19 ENCOUNTER — RX RENEWAL (OUTPATIENT)
Age: 66
End: 2021-05-19

## 2021-06-07 ENCOUNTER — OUTPATIENT (OUTPATIENT)
Dept: OUTPATIENT SERVICES | Facility: HOSPITAL | Age: 66
LOS: 1 days | Discharge: HOME | End: 2021-06-07

## 2021-06-07 DIAGNOSIS — Z98.89 OTHER SPECIFIED POSTPROCEDURAL STATES: Chronic | ICD-10-CM

## 2021-06-07 DIAGNOSIS — C50.919 MALIGNANT NEOPLASM OF UNSPECIFIED SITE OF UNSPECIFIED FEMALE BREAST: ICD-10-CM

## 2021-06-07 DIAGNOSIS — Z98.890 OTHER SPECIFIED POSTPROCEDURAL STATES: Chronic | ICD-10-CM

## 2021-06-08 ENCOUNTER — APPOINTMENT (OUTPATIENT)
Dept: HEMATOLOGY ONCOLOGY | Facility: CLINIC | Age: 66
End: 2021-06-08
Payer: MEDICAID

## 2021-06-08 VITALS
BODY MASS INDEX: 28.31 KG/M2 | OXYGEN SATURATION: 98 % | SYSTOLIC BLOOD PRESSURE: 114 MMHG | DIASTOLIC BLOOD PRESSURE: 75 MMHG | TEMPERATURE: 98 F | WEIGHT: 180.78 LBS | HEART RATE: 84 BPM | RESPIRATION RATE: 14 BRPM

## 2021-06-08 PROCEDURE — 99214 OFFICE O/P EST MOD 30 MIN: CPT

## 2021-06-08 PROCEDURE — 99072 ADDL SUPL MATRL&STAF TM PHE: CPT

## 2021-06-15 ENCOUNTER — APPOINTMENT (OUTPATIENT)
Dept: INTERNAL MEDICINE | Facility: CLINIC | Age: 66
End: 2021-06-15
Payer: MEDICAID

## 2021-06-15 VITALS — DIASTOLIC BLOOD PRESSURE: 68 MMHG | HEART RATE: 75 BPM | SYSTOLIC BLOOD PRESSURE: 108 MMHG | RESPIRATION RATE: 14 BRPM

## 2021-06-15 DIAGNOSIS — B35.1 TINEA UNGUIUM: ICD-10-CM

## 2021-06-15 PROCEDURE — 99072 ADDL SUPL MATRL&STAF TM PHE: CPT

## 2021-06-15 PROCEDURE — 99396 PREV VISIT EST AGE 40-64: CPT | Mod: 25

## 2021-06-15 RX ORDER — DICLOFENAC SODIUM 3 G/100G
3 GEL TOPICAL
Qty: 1 | Refills: 0 | Status: DISCONTINUED | COMMUNITY
Start: 2020-11-19 | End: 2021-06-15

## 2021-06-15 RX ORDER — TRIAMCINOLONE ACETONIDE 1 MG/G
0.1 CREAM TOPICAL TWICE DAILY
Qty: 1 | Refills: 3 | Status: DISCONTINUED | COMMUNITY
Start: 2020-06-05 | End: 2021-06-15

## 2021-06-15 RX ORDER — TRAMADOL HYDROCHLORIDE 50 MG/1
50 TABLET, COATED ORAL AT BEDTIME
Qty: 10 | Refills: 0 | Status: DISCONTINUED | COMMUNITY
Start: 2020-11-16 | End: 2021-06-15

## 2021-06-15 RX ORDER — TRIAMCINOLONE ACETONIDE 5 MG/G
0.5 CREAM TOPICAL 3 TIMES DAILY
Qty: 2 | Refills: 11 | Status: DISCONTINUED | COMMUNITY
End: 2021-06-15

## 2021-06-15 NOTE — REVIEW OF SYSTEMS
[Chest Pain] : no chest pain [Palpitations] : no palpitations [Lower Ext Edema] : no lower extremity edema [Shortness Of Breath] : no shortness of breath [Wheezing] : no wheezing [Cough] : no cough [Dyspnea on Exertion] : no dyspnea on exertion [Melena] : no melena [Headache] : no headache [Dizziness] : no dizziness [Suicidal] : not suicidal [de-identified] : Mild redness lateral side of L great toe

## 2021-06-15 NOTE — PHYSICAL EXAM
[de-identified] : umbilical and midline and transverse pubic scar from abdominoplasty [de-identified] : Bilat hands no swelling redness deformity.  Mild L pronator area resolving discoloration.   [de-identified] : Faint redness L great toe proximal and lateral area.  Possible onychomycosis L lateral lower nail

## 2021-06-15 NOTE — HISTORY OF PRESENT ILLNESS
[FreeTextEntry1] : Came for PE.  F/up chol 260.  \par Saw Onc for breast CA.  THey rec genetic testing and otherwise, 1 year f/up. \par TO see GI.  \par COVID vaccine compete.  Concerned about redness L great toe nail related to vaccine.

## 2021-06-15 NOTE — ASSESSMENT
[FreeTextEntry1] : Chol 260.  Needs f/up.  \par L toe nail faint subungual hematoma.  Possible onychomycosis L lat lower nail.  \par Breast CA Right.  s/p RT and tamoxifen x 5 years.  Ended 5-11-19  Saw Oncology.  Rec genetic testing and 1 year f/up\par Colonoscopy was 2016.  Hx of polyps.  Dr Booth.  Rec 5 year f/up.  \par Cataract and s/p IOL R eye felt due to Tamoxifen.\par s/p ventral hernia repair and abdominoplasty

## 2021-06-18 LAB
25(OH)D3 SERPL-MCNC: 31.7 NG/ML
ALBUMIN SERPL ELPH-MCNC: 4.4 G/DL
ALP BLD-CCNC: 84 U/L
ALT SERPL-CCNC: 22 U/L
ANION GAP SERPL CALC-SCNC: 12 MMOL/L
AST SERPL-CCNC: 16 U/L
BASOPHILS # BLD AUTO: 0.03 K/UL
BASOPHILS NFR BLD AUTO: 0.4 %
BILIRUB SERPL-MCNC: 0.2 MG/DL
BUN SERPL-MCNC: 23 MG/DL
CALCIUM SERPL-MCNC: 9.7 MG/DL
CHLORIDE SERPL-SCNC: 102 MMOL/L
CHOLEST SERPL-MCNC: 253 MG/DL
CO2 SERPL-SCNC: 28 MMOL/L
CREAT SERPL-MCNC: 0.99 MG/DL
EOSINOPHIL # BLD AUTO: 0.15 K/UL
EOSINOPHIL NFR BLD AUTO: 1.8 %
ESTIMATED AVERAGE GLUCOSE: 108 MG/DL
GLUCOSE SERPL-MCNC: 90 MG/DL
HBA1C MFR BLD HPLC: 5.4 %
HCT VFR BLD CALC: 42.5 %
HDLC SERPL-MCNC: 41 MG/DL
HGB BLD-MCNC: 13.3 G/DL
IMM GRANULOCYTES NFR BLD AUTO: 1 %
LDLC SERPL CALC-MCNC: 133 MG/DL
LYMPHOCYTES # BLD AUTO: 3.16 K/UL
LYMPHOCYTES NFR BLD AUTO: 38.2 %
MAN DIFF?: NORMAL
MCHC RBC-ENTMCNC: 27.4 PG
MCHC RBC-ENTMCNC: 31.3 GM/DL
MCV RBC AUTO: 87.6 FL
MONOCYTES # BLD AUTO: 0.63 K/UL
MONOCYTES NFR BLD AUTO: 7.6 %
NEUTROPHILS # BLD AUTO: 4.23 K/UL
NEUTROPHILS NFR BLD AUTO: 51 %
NONHDLC SERPL-MCNC: 212 MG/DL
PLATELET # BLD AUTO: 288 K/UL
POTASSIUM SERPL-SCNC: 5.3 MMOL/L
PROT SERPL-MCNC: 6.9 G/DL
RBC # BLD: 4.85 M/UL
RBC # FLD: 13.2 %
SODIUM SERPL-SCNC: 141 MMOL/L
TRIGL SERPL-MCNC: 395 MG/DL
TSH SERPL-ACNC: 0.46 UIU/ML
WBC # FLD AUTO: 8.28 K/UL

## 2021-07-14 ENCOUNTER — RX RENEWAL (OUTPATIENT)
Age: 66
End: 2021-07-14

## 2021-07-15 ENCOUNTER — RX RENEWAL (OUTPATIENT)
Age: 66
End: 2021-07-15

## 2021-07-16 ENCOUNTER — RX RENEWAL (OUTPATIENT)
Age: 66
End: 2021-07-16

## 2021-07-19 ENCOUNTER — RX RENEWAL (OUTPATIENT)
Age: 66
End: 2021-07-19

## 2021-07-22 ENCOUNTER — OUTPATIENT (OUTPATIENT)
Dept: OUTPATIENT SERVICES | Facility: HOSPITAL | Age: 66
LOS: 1 days | End: 2021-07-22
Payer: COMMERCIAL

## 2021-07-22 ENCOUNTER — APPOINTMENT (OUTPATIENT)
Dept: ULTRASOUND IMAGING | Facility: CLINIC | Age: 66
End: 2021-07-22
Payer: MEDICAID

## 2021-07-22 DIAGNOSIS — Z00.8 ENCOUNTER FOR OTHER GENERAL EXAMINATION: ICD-10-CM

## 2021-07-22 DIAGNOSIS — Z98.890 OTHER SPECIFIED POSTPROCEDURAL STATES: Chronic | ICD-10-CM

## 2021-07-22 DIAGNOSIS — Z98.89 OTHER SPECIFIED POSTPROCEDURAL STATES: Chronic | ICD-10-CM

## 2021-07-22 PROCEDURE — 76641 ULTRASOUND BREAST COMPLETE: CPT | Mod: 26,50

## 2021-07-22 PROCEDURE — 76641 ULTRASOUND BREAST COMPLETE: CPT

## 2021-07-26 ENCOUNTER — APPOINTMENT (OUTPATIENT)
Dept: ORTHOPEDIC SURGERY | Facility: CLINIC | Age: 66
End: 2021-07-26
Payer: MEDICAID

## 2021-07-26 PROCEDURE — 99072 ADDL SUPL MATRL&STAF TM PHE: CPT

## 2021-07-26 PROCEDURE — 20604 DRAIN/INJ JOINT/BURSA W/US: CPT | Mod: 59,RT

## 2021-07-26 PROCEDURE — 99213 OFFICE O/P EST LOW 20 MIN: CPT | Mod: 25

## 2021-07-28 NOTE — PROCEDURE
[de-identified] : Ultrasound Guided Injection \par Indication: Ensure placement within a small osteoarthritic joint\par \par Utlizing the Sonosite II Edge portable ultrasound machine, the Linear 25mm 10-5 MHz transducer, sterile probe cover and sterile ultrasound gel, ultrasound guidance with the probe in the long axis, utilizing an in-plane approach, was used for the following injection:\par \par Injection: Cortisone injection to the left 1st CMC joint\par Indication: Osteoarthritis.\par \par A discussion was had with the patient regarding this procedure and all questions were answered. All risks, benefits and alternatives were discussed. These included but were not limited to bleeding, infection, and allergic reaction. A timeout was performed prior to the procedure to ensure proper side and location.  Alcohol was used to clean and sterilize the skin over the lateral CMC joint. A 25-gauge needle was used to inject 0.5cc of 0.5% marcaine and 1cc of 6mg/mL betamethasone into the joint. A sterile bandage was then applied. The patient tolerated the procedure well and there were no complications. \par ______________________________\par Ultrasound Guided Injection \par Indication: Ensure placement within a small osteoarthritic joint\par \par Utlizing the Sonosite II Edge portable ultrasound machine, the Linear 25mm 10-5 MHz transducer, sterile probe cover and sterile ultrasound gel, ultrasound guidance with the probe in the long axis, utilizing an in-plane approach, was used for the following injection:\par \par Injection: Cortisone injection to the right 1st CMC joint\par Indication: Osteoarthritis.\par \par A discussion was had with the patient regarding this procedure and all questions were answered. All risks, benefits and alternatives were discussed. These included but were not limited to bleeding, infection, and allergic reaction. A timeout was performed prior to the procedure to ensure proper side and location.  Alcohol was used to clean and sterilize the skin over the lateral CMC joint. A 25-gauge needle was used to inject 0.5cc of 0.5% marcaine and 1cc of 6mg/mL betamethasone into the joint. A sterile bandage was then applied. The patient tolerated the procedure well and there were no complications. \par \par \par

## 2021-07-28 NOTE — HISTORY OF PRESENT ILLNESS
[de-identified] : Patient is here for b/l hand pain follow up. She was taking Diclofenac but states that she only gets about 2 hours of relief when she takes it. She was taking Tylenol every 4 hours and was instructed to stop taking that by another provider. She was also recommended to d/c Aspirin use. She is using heat and Aspercreme to treat her pain. Denies recent injury.

## 2021-07-28 NOTE — PHYSICAL EXAM
[de-identified] : Constitutional: Well-nourished, well-developed, No acute distress\par Respiratory:  Good respiratory effort, no SOB\par Lymphatic: No regional lymphadenopathy, no lymphedema\par Psychiatric: Pleasant and normal affect, alert and oriented x3\par Skin: Clean dry and intact B/L UE/LE\par Musculoskeletal: normal except where as noted in regional exam\par \par Right Hand:\par APPEARANCE: No swelling, no marked deformities or malalignment of the fingers\par POSITIVE TENDERNESS: + 1st CMC joint\par NONTENDER: all other osseous and ligamentous structures. \par ROM: + Crepitus and mild limitation of the first CMC joint, otherwise full & painless in CMC, MCP, and IP joints. \par RESISTIVE TESTING: painless resisted testing. \par SPECIAL TESTS: normal sensation of 1st through 5th digits, normal flex/ext, abd/add, and opposition of thumb, no triggering of fingers\par Vasc: 2+ radial pulse, normal capillary refill\par Neuro: AIN, PIN, Ulnar nerve intact to motor\par Sensation: Intact to light touch throughout\par \par Left Hand:\par APPEARANCE: No swelling, no marked deformities or malalignment of the fingers\par POSITIVE TENDERNESS: + 1st CMC joint\par NONTENDER: all other osseous and ligamentous structures. \par ROM: + Crepitus and mild limitation of the first CMC joint, otherwise full & painless in CMC, MCP, and IP joints. \par RESISTIVE TESTING: painless resisted testing. \par SPECIAL TESTS: normal sensation of 1st through 5th digits, normal flex/ext, abd/add, and opposition of thumb, no triggering of fingers\par Vasc: 2+ radial pulse, normal capillary refill\par Neuro: AIN, PIN, Ulnar nerve intact to motor\par Sensation: Intact to light touch throughout\par \par \par

## 2021-07-28 NOTE — REASON FOR VISIT
[Follow-Up Visit] : a follow-up visit for [Family Member] : family member [FreeTextEntry2] : b/l hand pain

## 2021-07-28 NOTE — DISCUSSION/SUMMARY
[de-identified] : Discussed findings of today's exam and possible causes of patient's pain.  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  Reviewed possible courses of treatment, and we collaboratively decided best course of treatment at this time will include conservative management.  We discussed various treatment options as well as associated risk/benefits/alternatives and patient elected to proceed with ultrasound-guided bilateral cortisone injections today (see procedure note).  Informed the patient that the numbing medicine in today's injection will last for about 4-6 hours. The steroid that was injected will start to work in 1 to 2 days, peak at 1-2 weeks, and may last up to 1-2 months.  Patient will be started on a course of hand therapy at this time to restore normal range of motion, strength and overall function.  Patient advised she can  over-the-counter paraffin wax bath to be used in the morning to help alleviate chronic joint pain and stiffness.  Follow up as needed.  Patient appreciates and agrees with current plan.\par \par This note was generated using dragon medical dictation software.  A reasonable effort has been made for proofreading its contents, but typos may still remain.  If there are any questions or points of clarification needed please notify my office.\par

## 2021-08-02 ENCOUNTER — APPOINTMENT (OUTPATIENT)
Dept: GYNECOLOGIC ONCOLOGY | Facility: CLINIC | Age: 66
End: 2021-08-02
Payer: MEDICAID

## 2021-08-02 VITALS
WEIGHT: 174 LBS | HEIGHT: 67 IN | BODY MASS INDEX: 27.31 KG/M2 | HEART RATE: 70 BPM | SYSTOLIC BLOOD PRESSURE: 132 MMHG | DIASTOLIC BLOOD PRESSURE: 72 MMHG

## 2021-08-02 PROCEDURE — 99213 OFFICE O/P EST LOW 20 MIN: CPT

## 2021-08-02 PROCEDURE — 99072 ADDL SUPL MATRL&STAF TM PHE: CPT

## 2021-08-02 RX ORDER — ATORVASTATIN CALCIUM 10 MG/1
10 TABLET, FILM COATED ORAL
Qty: 90 | Refills: 3 | Status: DISCONTINUED | COMMUNITY
Start: 2021-06-21 | End: 2021-08-02

## 2021-08-02 RX ORDER — DICLOFENAC SODIUM 50 MG/1
50 TABLET, DELAYED RELEASE ORAL
Qty: 40 | Refills: 1 | Status: DISCONTINUED | COMMUNITY
Start: 2021-01-29 | End: 2021-08-02

## 2021-08-03 LAB — HPV HIGH+LOW RISK DNA PNL CVX: NOT DETECTED

## 2021-08-06 LAB — CYTOLOGY CVX/VAG DOC THIN PREP: NORMAL

## 2021-08-10 ENCOUNTER — OUTPATIENT (OUTPATIENT)
Dept: OUTPATIENT SERVICES | Facility: HOSPITAL | Age: 66
LOS: 1 days | End: 2021-08-10
Payer: COMMERCIAL

## 2021-08-10 ENCOUNTER — APPOINTMENT (OUTPATIENT)
Dept: ULTRASOUND IMAGING | Facility: CLINIC | Age: 66
End: 2021-08-10
Payer: MEDICAID

## 2021-08-10 DIAGNOSIS — Z98.890 OTHER SPECIFIED POSTPROCEDURAL STATES: Chronic | ICD-10-CM

## 2021-08-10 DIAGNOSIS — D25.9 LEIOMYOMA OF UTERUS, UNSPECIFIED: ICD-10-CM

## 2021-08-10 DIAGNOSIS — Z98.89 OTHER SPECIFIED POSTPROCEDURAL STATES: Chronic | ICD-10-CM

## 2021-08-10 PROCEDURE — 76830 TRANSVAGINAL US NON-OB: CPT

## 2021-08-10 PROCEDURE — 76830 TRANSVAGINAL US NON-OB: CPT | Mod: 26

## 2021-08-12 ENCOUNTER — NON-APPOINTMENT (OUTPATIENT)
Age: 66
End: 2021-08-12

## 2021-08-20 ENCOUNTER — NON-APPOINTMENT (OUTPATIENT)
Age: 66
End: 2021-08-20

## 2021-08-30 ENCOUNTER — APPOINTMENT (OUTPATIENT)
Dept: GYNECOLOGIC ONCOLOGY | Facility: CLINIC | Age: 66
End: 2021-08-30
Payer: MEDICAID

## 2021-08-30 PROCEDURE — 99212 OFFICE O/P EST SF 10 MIN: CPT | Mod: 95

## 2021-09-20 ENCOUNTER — APPOINTMENT (OUTPATIENT)
Dept: ORTHOPEDIC SURGERY | Facility: CLINIC | Age: 66
End: 2021-09-20
Payer: MEDICAID

## 2021-09-20 ENCOUNTER — OUTPATIENT (OUTPATIENT)
Dept: OUTPATIENT SERVICES | Facility: HOSPITAL | Age: 66
LOS: 1 days | End: 2021-09-20
Payer: MEDICAID

## 2021-09-20 VITALS
HEIGHT: 67.5 IN | HEART RATE: 67 BPM | DIASTOLIC BLOOD PRESSURE: 80 MMHG | OXYGEN SATURATION: 99 % | SYSTOLIC BLOOD PRESSURE: 110 MMHG | RESPIRATION RATE: 14 BRPM | TEMPERATURE: 97 F | WEIGHT: 167.99 LBS

## 2021-09-20 DIAGNOSIS — R68.89 OTHER GENERAL SYMPTOMS AND SIGNS: ICD-10-CM

## 2021-09-20 DIAGNOSIS — Z98.890 OTHER SPECIFIED POSTPROCEDURAL STATES: Chronic | ICD-10-CM

## 2021-09-20 DIAGNOSIS — Z98.89 OTHER SPECIFIED POSTPROCEDURAL STATES: Chronic | ICD-10-CM

## 2021-09-20 DIAGNOSIS — R93.89 ABNORMAL FINDINGS ON DIAGNOSTIC IMAGING OF OTHER SPECIFIED BODY STRUCTURES: ICD-10-CM

## 2021-09-20 DIAGNOSIS — H26.9 UNSPECIFIED CATARACT: Chronic | ICD-10-CM

## 2021-09-20 LAB
ANION GAP SERPL CALC-SCNC: 11 MMOL/L — SIGNIFICANT CHANGE UP (ref 7–14)
BUN SERPL-MCNC: 17 MG/DL — SIGNIFICANT CHANGE UP (ref 7–23)
CALCIUM SERPL-MCNC: 9.4 MG/DL — SIGNIFICANT CHANGE UP (ref 8.4–10.5)
CHLORIDE SERPL-SCNC: 100 MMOL/L — SIGNIFICANT CHANGE UP (ref 98–107)
CO2 SERPL-SCNC: 28 MMOL/L — SIGNIFICANT CHANGE UP (ref 22–31)
CREAT SERPL-MCNC: 0.76 MG/DL — SIGNIFICANT CHANGE UP (ref 0.5–1.3)
GLUCOSE SERPL-MCNC: 115 MG/DL — HIGH (ref 70–99)
HCT VFR BLD CALC: 41.1 % — SIGNIFICANT CHANGE UP (ref 34.5–45)
HGB BLD-MCNC: 13.4 G/DL — SIGNIFICANT CHANGE UP (ref 11.5–15.5)
MCHC RBC-ENTMCNC: 27.3 PG — SIGNIFICANT CHANGE UP (ref 27–34)
MCHC RBC-ENTMCNC: 32.6 GM/DL — SIGNIFICANT CHANGE UP (ref 32–36)
MCV RBC AUTO: 83.9 FL — SIGNIFICANT CHANGE UP (ref 80–100)
NRBC # BLD: 0 /100 WBCS — SIGNIFICANT CHANGE UP
NRBC # FLD: 0 K/UL — SIGNIFICANT CHANGE UP
PLATELET # BLD AUTO: 287 K/UL — SIGNIFICANT CHANGE UP (ref 150–400)
POTASSIUM SERPL-MCNC: 4.8 MMOL/L — SIGNIFICANT CHANGE UP (ref 3.5–5.3)
POTASSIUM SERPL-SCNC: 4.8 MMOL/L — SIGNIFICANT CHANGE UP (ref 3.5–5.3)
RBC # BLD: 4.9 M/UL — SIGNIFICANT CHANGE UP (ref 3.8–5.2)
RBC # FLD: 12.8 % — SIGNIFICANT CHANGE UP (ref 10.3–14.5)
SODIUM SERPL-SCNC: 139 MMOL/L — SIGNIFICANT CHANGE UP (ref 135–145)
WBC # BLD: 10.11 K/UL — SIGNIFICANT CHANGE UP (ref 3.8–10.5)
WBC # FLD AUTO: 10.11 K/UL — SIGNIFICANT CHANGE UP (ref 3.8–10.5)

## 2021-09-20 PROCEDURE — 93010 ELECTROCARDIOGRAM REPORT: CPT

## 2021-09-20 PROCEDURE — 99072 ADDL SUPL MATRL&STAF TM PHE: CPT

## 2021-09-20 PROCEDURE — 20604 DRAIN/INJ JOINT/BURSA W/US: CPT | Mod: RT

## 2021-09-20 PROCEDURE — 99213 OFFICE O/P EST LOW 20 MIN: CPT | Mod: 25

## 2021-09-20 RX ORDER — ACETAMINOPHEN 500 MG
2 TABLET ORAL
Qty: 0 | Refills: 0 | DISCHARGE

## 2021-09-20 RX ORDER — IBUPROFEN 200 MG
3 TABLET ORAL
Qty: 0 | Refills: 0 | DISCHARGE

## 2021-09-20 NOTE — PHYSICAL EXAM
[de-identified] : Constitutional: Well-nourished, well-developed, No acute distress\par Respiratory:  Good respiratory effort, no SOB\par Lymphatic: No regional lymphadenopathy, no lymphedema\par Psychiatric: Pleasant and normal affect, alert and oriented x3\par Skin: Clean dry and intact B/L UE/LE\par Musculoskeletal: normal except where as noted in regional exam\par \par Right Hand:\par APPEARANCE: No swelling, no marked deformities or malalignment of the fingers\par POSITIVE TENDERNESS: + 1st CMC joint\par NONTENDER: all other osseous and ligamentous structures. \par ROM: + Crepitus and mild limitation of the first CMC joint, otherwise full & painless in CMC, MCP, and IP joints. \par RESISTIVE TESTING: painless resisted testing. \par SPECIAL TESTS: normal sensation of 1st through 5th digits, normal flex/ext, abd/add, and opposition of thumb, no triggering of fingers\par Vasc: 2+ radial pulse, normal capillary refill\par Neuro: AIN, PIN, Ulnar nerve intact to motor\par Sensation: Intact to light touch throughout\par \par Left Hand:\par APPEARANCE: No swelling, no marked deformities or malalignment of the fingers\par POSITIVE TENDERNESS: + 1st CMC joint\par NONTENDER: all other osseous and ligamentous structures. \par ROM: + Crepitus and mild limitation of the first CMC joint, otherwise full & painless in CMC, MCP, and IP joints. \par RESISTIVE TESTING: painless resisted testing. \par SPECIAL TESTS: normal sensation of 1st through 5th digits, normal flex/ext, abd/add, and opposition of thumb, no triggering of fingers\par Vasc: 2+ radial pulse, normal capillary refill\par Neuro: AIN, PIN, Ulnar nerve intact to motor\par Sensation: Intact to light touch throughout\par \par \par

## 2021-09-20 NOTE — HISTORY OF PRESENT ILLNESS
[de-identified] : Patient is here for bilateral hand first digit pain follow up, increased bilateral thumb pain localized to the CMC joints. She has pain with gripping and twisting activities. Since last appointment and after having to steroid injections she responded well and decreased pain and able to continue with her daily activities. She does not take any medication and she is interested in getting the steroid injections again today.

## 2021-09-20 NOTE — H&P PST ADULT - HISTORY OF PRESENT ILLNESS
65y/o female scheduled for diagnostic hysteroscopy, d&c with sono guidance on 9/29/2021.  Pt states, "hx breast cancer, s/p right lumpectomy 2015, s/p radiation followed by tamoxifen for 5 yrs. ago.  Went for routine gyn exam, identified endometrial thickening."  65y/o female scheduled for diagnostic hysteroscopy, d&c with sono guidance on 9/29/2021.  Pt states, "hx breast cancer, s/p right lumpectomy 2015, s/p radiation followed by tamoxifen for 5 yrs. ago.  Went for routine gyn exam, identified endometrial thickening."       No bp, IV, blood draw in right arm

## 2021-09-20 NOTE — DISCUSSION/SUMMARY
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  Reviewed possible courses of treatment, and we collaboratively decided best course of treatment at this time will include conservative management.  We discussed various treatment options as well as associated risk/benefits/alternatives and patient elected to proceed with repeat ultrasound-guided bilateral cortisone injections today (see procedure note).  Informed the patient that the numbing medicine in today's injection will last for about 4-6 hours. The steroid that was injected will start to work in 1 to 2 days, peak at 1-2 weeks, and may last up to 1-2 months.   Follow up as needed.  Patient appreciates and agrees with current plan.\par \par This note was generated using dragon medical dictation software.  A reasonable effort has been made for proofreading its contents, but typos may still remain.  If there are any questions or points of clarification needed please notify my office.\par

## 2021-09-20 NOTE — H&P PST ADULT - PROBLEM SELECTOR PLAN 1
Pt scheduled for diagnostic hysteroscopy, d&c, with sono guidance on 9/29/2021. labs done results pending, ekg done.  Pt scheduled for preop covid testing.  Preop teaching done, pt able to verbalize understanding.

## 2021-09-20 NOTE — PROCEDURE
[de-identified] : Ultrasound Guided Injection \par Indication: Ensure placement within a small osteoarthritic joint\par \par Utlizing the Sonosite II Edge portable ultrasound machine, the Linear 25mm 10-5 MHz transducer, sterile probe cover and sterile ultrasound gel, ultrasound guidance with the probe in the long axis, utilizing an in-plane approach, was used for the following injection:\par \par Injection: Cortisone injection to the left 1st CMC joint\par Indication: Osteoarthritis.\par \par A discussion was had with the patient regarding this procedure and all questions were answered. All risks, benefits and alternatives were discussed. These included but were not limited to bleeding, infection, and allergic reaction. A timeout was performed prior to the procedure to ensure proper side and location.  Alcohol was used to clean and sterilize the skin over the lateral CMC joint. A 25-gauge needle was used to inject 0.5cc of 0.5% marcaine and 1cc of 6mg/mL betamethasone into the joint. A sterile bandage was then applied. The patient tolerated the procedure well and there were no complications. \par ______________________________\par Ultrasound Guided Injection \par Indication: Ensure placement within a small osteoarthritic joint\par \par Utlizing the Sonosite II Edge portable ultrasound machine, the Linear 25mm 10-5 MHz transducer, sterile probe cover and sterile ultrasound gel, ultrasound guidance with the probe in the long axis, utilizing an in-plane approach, was used for the following injection:\par \par Injection: Cortisone injection to the right 1st CMC joint\par Indication: Osteoarthritis.\par \par A discussion was had with the patient regarding this procedure and all questions were answered. All risks, benefits and alternatives were discussed. These included but were not limited to bleeding, infection, and allergic reaction. A timeout was performed prior to the procedure to ensure proper side and location.  Alcohol was used to clean and sterilize the skin over the lateral CMC joint. A 25-gauge needle was used to inject 0.5cc of 0.5% marcaine and 1cc of 6mg/mL betamethasone into the joint. A sterile bandage was then applied. The patient tolerated the procedure well and there were no complications. \par \par \par

## 2021-09-20 NOTE — H&P PST ADULT - NSICDXPASTSURGICALHX_GEN_ALL_CORE_FT
PAST SURGICAL HISTORY:  Cataract right, lens implant    H/O lumpectomy right breast ~ Feb. 11, 2014 and axillary node dissection    S/P dilatation and curettage 2019    S/P hernia surgery ventral hernia

## 2021-09-20 NOTE — H&P PST ADULT - NSICDXPASTMEDICALHX_GEN_ALL_CORE_FT
PAST MEDICAL HISTORY:  Abnormal finding     Breast cancer s/p 8 weeks radiation therapy, s/p R lumpectomy in 2015    Ventral hernia without obstruction or gangrene

## 2021-09-20 NOTE — H&P PST ADULT - ATTENDING COMMENTS
Seen in ASU. She reports no changes. Planned procedure disc, incl sono guidance. Consent form reviewed. Instructions reviewed. All Q/A.

## 2021-09-26 ENCOUNTER — APPOINTMENT (OUTPATIENT)
Dept: DISASTER EMERGENCY | Facility: CLINIC | Age: 66
End: 2021-09-26

## 2021-09-27 LAB — SARS-COV-2 N GENE NPH QL NAA+PROBE: NOT DETECTED

## 2021-09-28 ENCOUNTER — TRANSCRIPTION ENCOUNTER (OUTPATIENT)
Age: 66
End: 2021-09-28

## 2021-09-28 ENCOUNTER — NON-APPOINTMENT (OUTPATIENT)
Age: 66
End: 2021-09-28

## 2021-09-28 ENCOUNTER — APPOINTMENT (OUTPATIENT)
Dept: INTERNAL MEDICINE | Facility: CLINIC | Age: 66
End: 2021-09-28
Payer: MEDICAID

## 2021-09-28 VITALS — BODY MASS INDEX: 27 KG/M2 | WEIGHT: 172 LBS | HEIGHT: 67 IN

## 2021-09-28 VITALS — RESPIRATION RATE: 14 BRPM | HEART RATE: 74 BPM | DIASTOLIC BLOOD PRESSURE: 60 MMHG | SYSTOLIC BLOOD PRESSURE: 104 MMHG

## 2021-09-28 VITALS
WEIGHT: 167.99 LBS | HEART RATE: 65 BPM | OXYGEN SATURATION: 100 % | HEIGHT: 67.5 IN | SYSTOLIC BLOOD PRESSURE: 127 MMHG | RESPIRATION RATE: 16 BRPM | DIASTOLIC BLOOD PRESSURE: 55 MMHG | TEMPERATURE: 97 F

## 2021-09-28 DIAGNOSIS — K58.0 IRRITABLE BOWEL SYNDROME WITH DIARRHEA: ICD-10-CM

## 2021-09-28 DIAGNOSIS — Z78.9 OTHER SPECIFIED HEALTH STATUS: ICD-10-CM

## 2021-09-28 PROCEDURE — 99072 ADDL SUPL MATRL&STAF TM PHE: CPT

## 2021-09-28 PROCEDURE — 99214 OFFICE O/P EST MOD 30 MIN: CPT

## 2021-09-28 NOTE — ASSESSMENT
[High Risk Surgery - Intraperitoneal, Intrathoracic or Supringuinal Vascular Procedures] : High Risk Surgery - Intraperitoneal, Intrathoracic or Supringuinal Vascular Procedures - No (0) [Ischemic Heart Disease] : Ischemic Heart Disease - No (0) [Congestive Heart Failure] : Congestive Heart Failure - No (0) [Prior Cerebrovascular Accident or TIA] : Prior Cerebrovascular Accident or TIA - No (0) [Creatinine >= 2mg/dL (1 Point)] : Creatinine >= 2mg/dL - No (0) [Insulin-dependent Diabetic (1 Point)] : Insulin-dependent Diabetic - No (0) [0] : 0 , RCRI Class: I, Risk of Post-Op Cardiac Complications: 3.9%, 95% CI for Risk Estimate: 2.8% - 5.4% [Patient Optimized for Surgery] : Patient optimized for surgery [No Further Testing Recommended] : no further testing recommended [Modify medications prior to procedure] : Modify medications prior to procedure [As per surgery] : as per surgery [FreeTextEntry4] : Optimal condition for hysteroscopy with US guidance for thickened endometrium.\par No suspicion of cardiac ischemia.\par Covid vaccine x 2 doses completed\par Flu vaccine received 9/2021\par Hyperchol.  Chooses not to take Atorvastatin.\par Health maintenance.  Takes Vit D 50 K units weekly.  Presently on hold since 1 week. \par No loose teeth or dental appliances.\par s/p hernia repair, multiple uterine procedures and breast CA surgery, lipoma removal L forearm, abdominoplasty, without anesthetic complication.  \par s/p lumpectomy and RT R breast.  No IVs to be placed in R arm.  No BPs to be done from R arm. \par  [FreeTextEntry7] : Weekly vitamin D on hold.

## 2021-09-28 NOTE — HISTORY OF PRESENT ILLNESS
[No Pertinent Cardiac History] : no history of aortic stenosis, atrial fibrillation, coronary artery disease, recent myocardial infarction, or implantable device/pacemaker [No Pertinent Pulmonary History] : no history of asthma, COPD, sleep apnea, or smoking [Smoker] : not a smoker [No Adverse Anesthesia Reaction] : no adverse anesthesia reaction in self or family member [Family Member] : no family member with adverse anesthesia reaction/sudden death [Self] : no previous adverse anesthesia reaction [Chronic Anticoagulation] : no chronic anticoagulation [Chronic Kidney Disease] : no chronic kidney disease [Diabetes] : no diabetes [FreeTextEntry1] : Hysteroscopy with US guidance for thickened endometrium [FreeTextEntry2] : 9/29/21 [FreeTextEntry3] : Dr. Sherry Patel [FreeTextEntry4] : Able to run 3 miles in 40 minutes

## 2021-09-28 NOTE — REVIEW OF SYSTEMS
[Chest Pain] : no chest pain [Palpitations] : no palpitations [Lower Ext Edema] : no lower extremity edema [Shortness Of Breath] : no shortness of breath [Wheezing] : no wheezing [Cough] : no cough [Dyspnea on Exertion] : no dyspnea on exertion [Melena] : no melena [Headache] : no headache [Dizziness] : no dizziness [Suicidal] : not suicidal [Negative] : Neurological [FreeTextEntry8] : See HPI

## 2021-09-28 NOTE — PHYSICAL EXAM
[Normal TMs] : both tympanic membranes were normal [Pedal Pulses Present] : the pedal pulses are present [No Edema] : there was no peripheral edema [Soft] : abdomen soft [Non Tender] : non-tender [Non-distended] : non-distended [No Masses] : no abdominal mass palpated [Normal Bowel Sounds] : normal bowel sounds [No CVA Tenderness] : no CVA  tenderness [Normal] : normal gait, coordination grossly intact, no focal deficits and deep tendon reflexes were 2+ and symmetric [de-identified] : umbilical and midline and transverse pubic scar from abdominoplasty

## 2021-09-28 NOTE — CURRENT MEDS
[Takes medication as prescribed] : does not take [None] : Patient does not have any barriers to medication adherence [FreeTextEntry1] : Declines to take Atorvastatin

## 2021-09-29 ENCOUNTER — OUTPATIENT (OUTPATIENT)
Dept: OUTPATIENT SERVICES | Facility: HOSPITAL | Age: 66
LOS: 1 days | Discharge: HOME | End: 2021-09-29
Payer: MEDICAID

## 2021-09-29 ENCOUNTER — APPOINTMENT (OUTPATIENT)
Dept: GYNECOLOGIC ONCOLOGY | Facility: HOSPITAL | Age: 66
End: 2021-09-29

## 2021-09-29 ENCOUNTER — RESULT REVIEW (OUTPATIENT)
Age: 66
End: 2021-09-29

## 2021-09-29 VITALS
SYSTOLIC BLOOD PRESSURE: 133 MMHG | DIASTOLIC BLOOD PRESSURE: 70 MMHG | HEART RATE: 72 BPM | TEMPERATURE: 98 F | RESPIRATION RATE: 15 BRPM | OXYGEN SATURATION: 97 %

## 2021-09-29 DIAGNOSIS — Z98.890 OTHER SPECIFIED POSTPROCEDURAL STATES: Chronic | ICD-10-CM

## 2021-09-29 DIAGNOSIS — H26.9 UNSPECIFIED CATARACT: Chronic | ICD-10-CM

## 2021-09-29 DIAGNOSIS — R93.89 ABNORMAL FINDINGS ON DIAGNOSTIC IMAGING OF OTHER SPECIFIED BODY STRUCTURES: ICD-10-CM

## 2021-09-29 DIAGNOSIS — Z98.89 OTHER SPECIFIED POSTPROCEDURAL STATES: Chronic | ICD-10-CM

## 2021-09-29 PROCEDURE — 58558 HYSTEROSCOPY BIOPSY: CPT

## 2021-09-29 PROCEDURE — 88305 TISSUE EXAM BY PATHOLOGIST: CPT | Mod: 26

## 2021-09-29 NOTE — BRIEF OPERATIVE NOTE - OPERATION/FINDINGS
Sono used throughout.   Sound 3.25in. H/S, small polypoid tissue at os. EC canal o/w nl/ EM with polypoid tissue evacuated with polyp forceps and curette. Monsels applied to cervical polyp site.   Hemostasis noted after period of observation.   Urinae  clear, pre- and post-op.  CC. Sono used throughout.   Sound 8+ cm. H/S - EC canal nl; EM with polypoid tissue evacuated with polyp forceps and curette. Sono tech felt EM thin after sampling.    Hemostasis noted after period of observation.   Urine  clear, pre- and post-op.  CC.

## 2021-09-29 NOTE — BRIEF OPERATIVE NOTE - NSICDXBRIEFPROCEDURE_GEN_ALL_CORE_FT
PROCEDURES:  D&C (dilatation and curettage, scraping of uterus) 29-Sep-2021 10:58:45  Ian Patel  Hysteroscopy 29-Sep-2021 10:58:57  Ian Patel

## 2021-09-29 NOTE — ASU DISCHARGE PLAN (ADULT/PEDIATRIC) - PLEASE INDICATE TEMPERATURE IN FAHRENHEIT OR CELSIUS

## 2021-09-29 NOTE — ASU DISCHARGE PLAN (ADULT/PEDIATRIC) - ASU DC SPECIAL INSTRUCTIONSFT
Pelvic rest (nothing per vagina x 1 wk.     Call with any questions or concerns Pelvic rest (nothing per vagina) x 1 wk.     Call with any questions or concerns

## 2021-09-29 NOTE — ASU DISCHARGE PLAN (ADULT/PEDIATRIC) - FOR NEXT 24 HOURS DO NOT:
Subjective   Edwige Nichols is a 49 y.o. female.     History of Present Illness     Her BP has consistently been myah he last month or so  Was noted at the gyn office and then has been high at home  A lot of stress at home with her son, work, dad with illness  Her boss  who she was close with  Her son has GI issues, [facility]    She does have HA before her periods    She had hospitalizations when on wellbutrin and zoloft and so is wary of being on medicine  She knows she is worried all the time and cannot stop worrying    She has had hair loss as well  Nothing seems to make this better nor worse    The following portions of the patient's history were reviewed and updated as appropriate: allergies, current medications, past family history, past medical history, past social history, past surgical history and problem list.    Review of Systems   Constitutional: Negative.    HENT: Negative.    Eyes: Negative.    Respiratory: Negative.  Negative for shortness of breath.    Cardiovascular: Negative.  Negative for chest pain.   Gastrointestinal: Negative.    Musculoskeletal: Negative.    Skin: Negative.    Neurological: Negative.    Psychiatric/Behavioral: Positive for dysphoric mood. The patient is nervous/anxious.    All other systems reviewed and are negative.      Objective   Physical Exam   Constitutional: She appears well-developed and well-nourished. No distress.   Cardiovascular: Normal rate, regular rhythm and normal heart sounds.   Pulmonary/Chest: Effort normal and breath sounds normal.   Psychiatric: Her behavior is normal.   Tearful, stressed   Nursing note and vitals reviewed.      Assessment/Plan   Edwige was seen today for hypertension.    Diagnoses and all orders for this visit:    Essential hypertension  -     propranolol LA (INDERAL LA) 80 MG 24 hr capsule; Take 1 capsule by mouth Daily.  -     CBC & Differential  -     Comprehensive Metabolic Panel  -     Lipid  Panel    Generalized anxiety disorder    Hair loss  -     TSH    pros/cons/SE discussed with pt.  Will start inderal LA and recheck in one month.  She will call with any further issues with the medicine  Mood continues to be an issue but she is wary of medicine due to past issues.  Consider genetic testing in the future  Will check TSH for hair changes but think stress playing major role          Statement Selected

## 2021-10-01 ENCOUNTER — NON-APPOINTMENT (OUTPATIENT)
Age: 66
End: 2021-10-01

## 2021-10-05 LAB — SURGICAL PATHOLOGY STUDY: SIGNIFICANT CHANGE UP

## 2021-10-07 ENCOUNTER — NON-APPOINTMENT (OUTPATIENT)
Age: 66
End: 2021-10-07

## 2021-10-07 DIAGNOSIS — N95.0 POSTMENOPAUSAL BLEEDING: ICD-10-CM

## 2021-10-07 DIAGNOSIS — D25.9 LEIOMYOMA OF UTERUS, UNSPECIFIED: ICD-10-CM
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
## 2021-10-18 ENCOUNTER — APPOINTMENT (OUTPATIENT)
Dept: GYNECOLOGIC ONCOLOGY | Facility: CLINIC | Age: 66
End: 2021-10-18
Payer: MEDICAID

## 2021-10-18 VITALS
DIASTOLIC BLOOD PRESSURE: 81 MMHG | TEMPERATURE: 97.8 F | HEART RATE: 79 BPM | WEIGHT: 168.13 LBS | OXYGEN SATURATION: 98 % | BODY MASS INDEX: 26.39 KG/M2 | SYSTOLIC BLOOD PRESSURE: 132 MMHG | HEIGHT: 67 IN

## 2021-10-18 DIAGNOSIS — R93.89 ABNORMAL FINDINGS ON DIAGNOSTIC IMAGING OF OTHER SPECIFIED BODY STRUCTURES: ICD-10-CM

## 2021-10-18 PROCEDURE — 99072 ADDL SUPL MATRL&STAF TM PHE: CPT

## 2021-10-18 PROCEDURE — 99213 OFFICE O/P EST LOW 20 MIN: CPT

## 2021-10-23 NOTE — LETTER BODY
[Dear  ___] : Dear  [unfilled], [FreeTextEntry2] : This letter is to provide follow up on Deandra Davison who underwent a D&C, Hysteroscopy for what proved to be benign disease, identifying an endometrial polyp. \par \par She has recuperated well from surgery and will see you for ongoing gynecologic care.

## 2021-10-23 NOTE — ASSESSMENT
[FreeTextEntry1] : She is recuperating well and we discussed her path; a copy was provided to her. Her instructions were discussed. All Q/A. She'll RTO prn and see her primary Gyn for ongoing care.\par

## 2021-10-23 NOTE — REASON FOR VISIT
[de-identified] : 9/29/21 [de-identified] : D&C, hysteroscopy [de-identified] : She feels well and notes no fever, GI/ concerns, VB or VD. \par

## 2021-10-30 ENCOUNTER — RX RENEWAL (OUTPATIENT)
Age: 66
End: 2021-10-30

## 2021-11-29 ENCOUNTER — OUTPATIENT (OUTPATIENT)
Dept: OUTPATIENT SERVICES | Facility: HOSPITAL | Age: 66
LOS: 1 days | End: 2021-11-29
Payer: COMMERCIAL

## 2021-11-29 ENCOUNTER — APPOINTMENT (OUTPATIENT)
Dept: MAMMOGRAPHY | Facility: CLINIC | Age: 66
End: 2021-11-29
Payer: MEDICAID

## 2021-11-29 DIAGNOSIS — Z98.89 OTHER SPECIFIED POSTPROCEDURAL STATES: Chronic | ICD-10-CM

## 2021-11-29 DIAGNOSIS — H26.9 UNSPECIFIED CATARACT: Chronic | ICD-10-CM

## 2021-11-29 DIAGNOSIS — Z12.31 ENCOUNTER FOR SCREENING MAMMOGRAM FOR MALIGNANT NEOPLASM OF BREAST: ICD-10-CM

## 2021-11-29 DIAGNOSIS — Z98.890 OTHER SPECIFIED POSTPROCEDURAL STATES: Chronic | ICD-10-CM

## 2021-11-29 DIAGNOSIS — Z00.8 ENCOUNTER FOR OTHER GENERAL EXAMINATION: ICD-10-CM

## 2021-11-29 PROCEDURE — 77063 BREAST TOMOSYNTHESIS BI: CPT | Mod: 26

## 2021-11-29 PROCEDURE — 77063 BREAST TOMOSYNTHESIS BI: CPT

## 2021-11-29 PROCEDURE — 77067 SCR MAMMO BI INCL CAD: CPT | Mod: 26

## 2021-11-29 PROCEDURE — 77067 SCR MAMMO BI INCL CAD: CPT

## 2021-12-01 ENCOUNTER — APPOINTMENT (OUTPATIENT)
Dept: ULTRASOUND IMAGING | Facility: CLINIC | Age: 66
End: 2021-12-01

## 2021-12-06 ENCOUNTER — APPOINTMENT (OUTPATIENT)
Dept: ORTHOPEDIC SURGERY | Facility: CLINIC | Age: 66
End: 2021-12-06
Payer: MEDICAID

## 2021-12-06 VITALS — HEIGHT: 67 IN | WEIGHT: 168 LBS | BODY MASS INDEX: 26.37 KG/M2

## 2021-12-06 DIAGNOSIS — M79.641 PAIN IN RIGHT HAND: ICD-10-CM

## 2021-12-06 PROCEDURE — 20606 DRAIN/INJ JOINT/BURSA W/US: CPT | Mod: LT

## 2021-12-06 PROCEDURE — 99213 OFFICE O/P EST LOW 20 MIN: CPT | Mod: 25

## 2021-12-06 PROCEDURE — 99072 ADDL SUPL MATRL&STAF TM PHE: CPT

## 2021-12-06 NOTE — PHYSICAL EXAM
[de-identified] : Constitutional: Well-nourished, well-developed, No acute distress\par Respiratory: Good respiratory effort, no SOB\par Lymphatic: No regional lymphadenopathy, no lymphedema\par Psychiatric: Pleasant and normal affect, alert and oriented x3\par Skin: Clean dry and intact B/L UE/LE\par Musculoskeletal: normal except where as noted in regional exam\par \par Right Hand:\par APPEARANCE: No swelling, no marked deformities or malalignment of the fingers\par POSITIVE TENDERNESS: + 1st CMC joint\par NONTENDER: all other osseous and ligamentous structures. \par ROM: + Crepitus and mild limitation of the first CMC joint, otherwise full & painless in CMC, MCP, and IP joints. \par RESISTIVE TESTING: painless resisted testing. \par SPECIAL TESTS: normal sensation of 1st through 5th digits, normal flex/ext, abd/add, and opposition of thumb, no triggering of fingers\par Vasc: 2+ radial pulse, normal capillary refill\par Neuro: AIN, PIN, Ulnar nerve intact to motor\par Sensation: Intact to light touch throughout\par \par Left Hand:\par APPEARANCE: No swelling, no marked deformities or malalignment of the fingers\par POSITIVE TENDERNESS: + 1st CMC joint\par NONTENDER: all other osseous and ligamentous structures. \par ROM: + Crepitus and mild limitation of the first CMC joint, otherwise full & painless in CMC, MCP, and IP joints. \par RESISTIVE TESTING: painless resisted testing. \par SPECIAL TESTS: normal sensation of 1st through 5th digits, normal flex/ext, abd/add, and opposition of thumb, no triggering of fingers\par Vasc: 2+ radial pulse, normal capillary refill\par Neuro: AIN, PIN, Ulnar nerve intact to motor\par Sensation: Intact to light touch throughout\par \par \par \par

## 2021-12-06 NOTE — REASON FOR VISIT
[Follow-Up Visit] : a follow-up visit for [Family Member] : family member [FreeTextEntry2] : b/l hand first digit pain

## 2021-12-06 NOTE — PROCEDURE
[de-identified] : Ultrasound Guided Injection \par Indication: Ensure placement within a small osteoarthritic joint\par \par Utlizing the Sonosite II Edge portable ultrasound machine, the Linear 25mm 10-5 MHz transducer, sterile probe cover and sterile ultrasound gel, ultrasound guidance with the probe in the long axis, utilizing an in-plane approach, was used for the following injection:\par \par Injection: Cortisone injection to the left 1st CMC joint\par Indication: Osteoarthritis.\par \par A discussion was had with the patient regarding this procedure and all questions were answered. All risks, benefits and alternatives were discussed. These included but were not limited to bleeding, infection, and allergic reaction. A timeout was done to ensure correct side and patient agreed to the procedure.  A [place] diogenes was created on the skin utilizing a plastic needle cap to diogenes the anticipated point of entry.  Alcohol was used to clean and sterilize the skin over the lateral CMC joint. A 25-gauge needle was used to inject 0.5cc of 0.25% bupivacaine and 1cc of 6mg/mL betamethasone into the joint. A sterile bandage was then applied. The patient tolerated the procedure well and there were no complications. \par ______________________________\par Ultrasound Guided Injection \par Indication: Ensure placement within a small osteoarthritic joint\par \par Utlizing the Sonosite II Edge portable ultrasound machine, the Linear 25mm 10-5 MHz transducer, sterile probe cover and sterile ultrasound gel, ultrasound guidance with the probe in the long axis, utilizing an in-plane approach, was used for the following injection:\par \par Injection: Cortisone injection to the right 1st CMC joint\par Indication: Osteoarthritis.\par \par A discussion was had with the patient regarding this procedure and all questions were answered. All risks, benefits and alternatives were discussed. These included but were not limited to bleeding, infection, and allergic reaction. A timeout was done to ensure correct side and patient agreed to the procedure.  A [place] diogenes was created on the skin utilizing a plastic needle cap to diogenes the anticipated point of entry.  Alcohol was used to clean and sterilize the skin over the lateral CMC joint. A 25-gauge needle was used to inject 0.5cc of 0.25% bupivacaine and 1cc of 6mg/mL betamethasone into the joint. A sterile bandage was then applied. The patient tolerated the procedure well and there were no complications. \par \par

## 2021-12-06 NOTE — DISCUSSION/SUMMARY
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  Patient was advised that we have to limit cortisone injections to 3 injections per joint per 12-month period.  Today would be her third injections within 6 months.  Patient understands she would have to wait another 6 months before following up for repeat injections.  Patient is aware that the permanent resolution option for this would be surgical intervention, but feels she cannot undergo surgery at this time.  We discussed various treatment options as well as associated risk/benefits/alternatives and patient elected to proceed with repeat bilateral ultrasound-guided cortisone injections today (see procedure note).  Informed the patient that the numbing medicine in today's injection will last for about 4-6 hours. The steroid that was injected will start to work in 1 to 2 days, peak at 1-2 weeks, and may last up to 1-2 months.  Patient is requesting prescription strength topical anesthetic to be sent to the pharmacy to be used as needed, I will send a prescription for topical lidocaine.  Follow up as needed.  Patient and spouse appreciate and agree with current plan.\par \par \par This note was generated using dragon medical dictation software.  A reasonable effort has been made for proofreading its contents, but typos may still remain.  If there are any questions or points of clarification needed please notify my office.

## 2021-12-06 NOTE — HISTORY OF PRESENT ILLNESS
[de-identified] : Patient is here for b/l hand first digit pain follow up. She had relief from her last injection but the pain returned without injury. She is requesting repeat injections at this time.

## 2022-01-18 ENCOUNTER — APPOINTMENT (OUTPATIENT)
Dept: INTERNAL MEDICINE | Facility: CLINIC | Age: 67
End: 2022-01-18
Payer: MEDICAID

## 2022-01-18 VITALS
OXYGEN SATURATION: 98 % | DIASTOLIC BLOOD PRESSURE: 78 MMHG | SYSTOLIC BLOOD PRESSURE: 110 MMHG | HEART RATE: 82 BPM | HEIGHT: 67 IN | WEIGHT: 169 LBS | BODY MASS INDEX: 26.53 KG/M2

## 2022-01-18 PROCEDURE — 99072 ADDL SUPL MATRL&STAF TM PHE: CPT

## 2022-01-18 PROCEDURE — 99212 OFFICE O/P EST SF 10 MIN: CPT | Mod: 25

## 2022-01-18 RX ORDER — CHOLECALCIFEROL (VITAMIN D3) 1250 MCG
1.25 MG CAPSULE ORAL
Qty: 13 | Refills: 3 | Status: DISCONTINUED | COMMUNITY
Start: 2021-09-28 | End: 2022-01-18

## 2022-01-18 RX ORDER — LIDOCAINE 5% 5 G/100G
5 CREAM TOPICAL
Qty: 1 | Refills: 0 | Status: DISCONTINUED | COMMUNITY
Start: 2021-12-06 | End: 2022-01-18

## 2022-01-18 RX ORDER — LIDOCAINE 5 G/100G
5 OINTMENT TOPICAL
Qty: 35.44 | Refills: 0 | Status: DISCONTINUED | COMMUNITY
Start: 2021-12-29 | End: 2022-01-18

## 2022-01-18 RX ORDER — ATORVASTATIN CALCIUM 10 MG/1
10 TABLET, FILM COATED ORAL
Qty: 90 | Refills: 3 | Status: DISCONTINUED | COMMUNITY
Start: 2021-08-02 | End: 2022-01-18

## 2022-01-18 NOTE — PLAN
[FreeTextEntry1] : Check fasting lipid panel. Pt advised to sign up for [facility] portal to review labs and communicate any questions or concerns directly. Yearly physical and return as needed for illness, medication refills, and new or existing complaints.

## 2022-01-18 NOTE — HISTORY OF PRESENT ILLNESS
[de-identified] : 66 year F with PMH breast cancer and HLD presents for repeat blood work. Pt denies CP/SOB, fever/chills, n/v/d/c.

## 2022-01-19 LAB
CHOLEST SERPL-MCNC: 257 MG/DL
HDLC SERPL-MCNC: 55 MG/DL
LDLC SERPL CALC-MCNC: 172 MG/DL
NONHDLC SERPL-MCNC: 202 MG/DL
TRIGL SERPL-MCNC: 151 MG/DL

## 2022-02-14 ENCOUNTER — APPOINTMENT (OUTPATIENT)
Dept: INTERNAL MEDICINE | Facility: CLINIC | Age: 67
End: 2022-02-14
Payer: MEDICAID

## 2022-02-14 VITALS
BODY MASS INDEX: 26.84 KG/M2 | OXYGEN SATURATION: 98 % | DIASTOLIC BLOOD PRESSURE: 76 MMHG | RESPIRATION RATE: 16 BRPM | HEIGHT: 67 IN | WEIGHT: 171 LBS | SYSTOLIC BLOOD PRESSURE: 122 MMHG | HEART RATE: 80 BPM

## 2022-02-14 DIAGNOSIS — R22.9 LOCALIZED SWELLING, MASS AND LUMP, UNSPECIFIED: ICD-10-CM

## 2022-02-14 DIAGNOSIS — R21 RASH AND OTHER NONSPECIFIC SKIN ERUPTION: ICD-10-CM

## 2022-02-14 DIAGNOSIS — N83.299 OTHER OVARIAN CYST, UNSPECIFIED SIDE: ICD-10-CM

## 2022-02-14 DIAGNOSIS — U07.1 COVID-19: ICD-10-CM

## 2022-02-14 PROCEDURE — 99072 ADDL SUPL MATRL&STAF TM PHE: CPT

## 2022-02-14 PROCEDURE — 99214 OFFICE O/P EST MOD 30 MIN: CPT

## 2022-02-14 RX ORDER — PROMETHAZINE HYDROCHLORIDE AND DEXTROMETHORPHAN HYDROBROMIDE ORAL SOLUTION 15; 6.25 MG/5ML; MG/5ML
6.25-15 SOLUTION ORAL
Qty: 200 | Refills: 0 | Status: DISCONTINUED | COMMUNITY
Start: 2022-01-10

## 2022-02-14 RX ORDER — EFINACONAZOLE 100 MG/ML
10 SOLUTION TOPICAL
Qty: 4 | Refills: 0 | Status: DISCONTINUED | COMMUNITY
Start: 2022-01-05

## 2022-02-14 NOTE — HISTORY OF PRESENT ILLNESS
[de-identified] : 66 year F with PMH breast cancer and HLD presents for a couple of complaints as described below.

## 2022-02-14 NOTE — PHYSICAL EXAM
[No Acute Distress] : no acute distress [Well Nourished] : well nourished [Well Developed] : well developed [Well-Appearing] : well-appearing [No Respiratory Distress] : no respiratory distress  [No Accessory Muscle Use] : no accessory muscle use [No Joint Swelling] : no joint swelling [Grossly Normal Strength/Tone] : grossly normal strength/tone [Coordination Grossly Intact] : coordination grossly intact [No Focal Deficits] : no focal deficits [Normal Gait] : normal gait [Normal Affect] : the affect was normal [Normal Insight/Judgement] : insight and judgment were intact [de-identified] : subcutaneous likely lipoma right arm

## 2022-02-14 NOTE — PLAN
[FreeTextEntry1] : Rx sent. CXR ordered. Refer to general surgery. Pt advised to sign up for [facility] portal to review labs and communicate any questions or concerns directly. Yearly physical and return as needed for illness, medication refills, and new or existing complaints. f/u 5 months for blood work.

## 2022-02-14 NOTE — REVIEW OF SYSTEMS
[Shortness Of Breath] : shortness of breath [Wheezing] : no wheezing [Cough] : no cough [Dyspnea on Exertion] : no dyspnea on exertion [Negative] : Psychiatric

## 2022-02-22 ENCOUNTER — APPOINTMENT (OUTPATIENT)
Dept: SURGERY | Facility: CLINIC | Age: 67
End: 2022-02-22
Payer: MEDICAID

## 2022-02-22 VITALS
HEIGHT: 67 IN | WEIGHT: 165 LBS | TEMPERATURE: 97.6 F | OXYGEN SATURATION: 100 % | SYSTOLIC BLOOD PRESSURE: 119 MMHG | DIASTOLIC BLOOD PRESSURE: 74 MMHG | RESPIRATION RATE: 18 BRPM | HEART RATE: 79 BPM | BODY MASS INDEX: 25.9 KG/M2

## 2022-02-22 DIAGNOSIS — D17.21 BENIGN LIPOMATOUS NEOPLASM OF SKIN AND SUBCUTANEOUS TISSUE OF RIGHT ARM: ICD-10-CM

## 2022-02-22 PROCEDURE — 99243 OFF/OP CNSLTJ NEW/EST LOW 30: CPT

## 2022-02-22 PROCEDURE — 99072 ADDL SUPL MATRL&STAF TM PHE: CPT

## 2022-02-22 NOTE — HISTORY OF PRESENT ILLNESS
[de-identified] : Deandra is a 66 year old female here for consultation of lipoma on arm.  Patient states that the lump in the right upper arm has slowly gotten larger.  She also states that there is another tiny lump on the medial aspect of her left forearm

## 2022-02-22 NOTE — PHYSICAL EXAM
[JVD] : no jugular venous distention  [Normal Breath Sounds] : Normal breath sounds [Normal Heart Sounds] : normal heart sounds [No Rash or Lesion] : No rash or lesion [Alert] : alert [Oriented to Person] : oriented to person [Oriented to Place] : oriented to place [Oriented to Time] : oriented to time [Calm] : calm [de-identified] : Well-developed well-nourished white female in no acute distress [de-identified] :  Within normal limits radial nerves II through XII intact-  [de-identified] : On the midportion of the right upper arm on the medial aspect there is a 1 cm movable nontender mass that is not attached to the skin.  On the left forearm there is a tiny little nodule which is deep.

## 2022-02-22 NOTE — ASSESSMENT
[FreeTextEntry1] : I have told the patient that I would excise the nodule on the right upper arm but I would not go hunting for this tiny pea-sized nodule on the left forearm.  The patient understands that the procedure will be done in the ambulatory facility at [facility] and that she will need someone to drive her home from the hospital on the day of the procedure.

## 2022-02-24 ENCOUNTER — APPOINTMENT (OUTPATIENT)
Dept: RADIOLOGY | Facility: CLINIC | Age: 67
End: 2022-02-24
Payer: MEDICAID

## 2022-02-24 ENCOUNTER — OUTPATIENT (OUTPATIENT)
Dept: OUTPATIENT SERVICES | Facility: HOSPITAL | Age: 67
LOS: 1 days | End: 2022-02-24
Payer: COMMERCIAL

## 2022-02-24 DIAGNOSIS — H26.9 UNSPECIFIED CATARACT: Chronic | ICD-10-CM

## 2022-02-24 DIAGNOSIS — Z98.89 OTHER SPECIFIED POSTPROCEDURAL STATES: Chronic | ICD-10-CM

## 2022-02-24 DIAGNOSIS — R06.02 SHORTNESS OF BREATH: ICD-10-CM

## 2022-02-24 DIAGNOSIS — Z98.890 OTHER SPECIFIED POSTPROCEDURAL STATES: Chronic | ICD-10-CM

## 2022-02-24 PROCEDURE — 71046 X-RAY EXAM CHEST 2 VIEWS: CPT | Mod: 26

## 2022-02-24 PROCEDURE — 71046 X-RAY EXAM CHEST 2 VIEWS: CPT

## 2022-03-03 ENCOUNTER — APPOINTMENT (OUTPATIENT)
Dept: MRI IMAGING | Facility: CLINIC | Age: 67
End: 2022-03-03
Payer: MEDICAID

## 2022-03-03 ENCOUNTER — OUTPATIENT (OUTPATIENT)
Dept: OUTPATIENT SERVICES | Facility: HOSPITAL | Age: 67
LOS: 1 days | End: 2022-03-03
Payer: COMMERCIAL

## 2022-03-03 DIAGNOSIS — Z98.890 OTHER SPECIFIED POSTPROCEDURAL STATES: Chronic | ICD-10-CM

## 2022-03-03 DIAGNOSIS — Z98.89 OTHER SPECIFIED POSTPROCEDURAL STATES: Chronic | ICD-10-CM

## 2022-03-03 DIAGNOSIS — H26.9 UNSPECIFIED CATARACT: Chronic | ICD-10-CM

## 2022-03-03 DIAGNOSIS — Z00.8 ENCOUNTER FOR OTHER GENERAL EXAMINATION: ICD-10-CM

## 2022-03-04 PROCEDURE — C8937: CPT

## 2022-03-04 PROCEDURE — A9585: CPT

## 2022-03-04 PROCEDURE — C8908: CPT

## 2022-03-04 PROCEDURE — 77049 MRI BREAST C-+ W/CAD BI: CPT | Mod: 26

## 2022-03-16 ENCOUNTER — OUTPATIENT (OUTPATIENT)
Dept: OUTPATIENT SERVICES | Facility: HOSPITAL | Age: 67
LOS: 1 days | End: 2022-03-16
Payer: COMMERCIAL

## 2022-03-16 VITALS
RESPIRATION RATE: 18 BRPM | SYSTOLIC BLOOD PRESSURE: 113 MMHG | HEART RATE: 78 BPM | TEMPERATURE: 98 F | HEIGHT: 67.5 IN | DIASTOLIC BLOOD PRESSURE: 76 MMHG | OXYGEN SATURATION: 99 % | WEIGHT: 166.01 LBS

## 2022-03-16 DIAGNOSIS — Z98.890 OTHER SPECIFIED POSTPROCEDURAL STATES: Chronic | ICD-10-CM

## 2022-03-16 DIAGNOSIS — Z86.018 PERSONAL HISTORY OF OTHER BENIGN NEOPLASM: ICD-10-CM

## 2022-03-16 DIAGNOSIS — H26.9 UNSPECIFIED CATARACT: Chronic | ICD-10-CM

## 2022-03-16 DIAGNOSIS — D17.21 BENIGN LIPOMATOUS NEOPLASM OF SKIN AND SUBCUTANEOUS TISSUE OF RIGHT ARM: ICD-10-CM

## 2022-03-16 DIAGNOSIS — Z01.818 ENCOUNTER FOR OTHER PREPROCEDURAL EXAMINATION: ICD-10-CM

## 2022-03-16 DIAGNOSIS — Z98.89 OTHER SPECIFIED POSTPROCEDURAL STATES: Chronic | ICD-10-CM

## 2022-03-16 LAB
ANION GAP SERPL CALC-SCNC: 13 MMOL/L — SIGNIFICANT CHANGE UP (ref 5–17)
BUN SERPL-MCNC: 22 MG/DL — SIGNIFICANT CHANGE UP (ref 7–23)
CALCIUM SERPL-MCNC: 9.7 MG/DL — SIGNIFICANT CHANGE UP (ref 8.4–10.5)
CHLORIDE SERPL-SCNC: 100 MMOL/L — SIGNIFICANT CHANGE UP (ref 96–108)
CO2 SERPL-SCNC: 26 MMOL/L — SIGNIFICANT CHANGE UP (ref 22–31)
CREAT SERPL-MCNC: 0.86 MG/DL — SIGNIFICANT CHANGE UP (ref 0.5–1.3)
EGFR: 75 ML/MIN/1.73M2 — SIGNIFICANT CHANGE UP
GLUCOSE SERPL-MCNC: 84 MG/DL — SIGNIFICANT CHANGE UP (ref 70–99)
HCT VFR BLD CALC: 41.3 % — SIGNIFICANT CHANGE UP (ref 34.5–45)
HGB BLD-MCNC: 13.3 G/DL — SIGNIFICANT CHANGE UP (ref 11.5–15.5)
MCHC RBC-ENTMCNC: 26.9 PG — LOW (ref 27–34)
MCHC RBC-ENTMCNC: 32.2 GM/DL — SIGNIFICANT CHANGE UP (ref 32–36)
MCV RBC AUTO: 83.4 FL — SIGNIFICANT CHANGE UP (ref 80–100)
NRBC # BLD: 0 /100 WBCS — SIGNIFICANT CHANGE UP (ref 0–0)
PLATELET # BLD AUTO: 303 K/UL — SIGNIFICANT CHANGE UP (ref 150–400)
POTASSIUM SERPL-MCNC: 4.8 MMOL/L — SIGNIFICANT CHANGE UP (ref 3.5–5.3)
POTASSIUM SERPL-SCNC: 4.8 MMOL/L — SIGNIFICANT CHANGE UP (ref 3.5–5.3)
RBC # BLD: 4.95 M/UL — SIGNIFICANT CHANGE UP (ref 3.8–5.2)
RBC # FLD: 13.4 % — SIGNIFICANT CHANGE UP (ref 10.3–14.5)
SODIUM SERPL-SCNC: 139 MMOL/L — SIGNIFICANT CHANGE UP (ref 135–145)
WBC # BLD: 10.2 K/UL — SIGNIFICANT CHANGE UP (ref 3.8–10.5)
WBC # FLD AUTO: 10.2 K/UL — SIGNIFICANT CHANGE UP (ref 3.8–10.5)

## 2022-03-16 PROCEDURE — 85027 COMPLETE CBC AUTOMATED: CPT

## 2022-03-16 PROCEDURE — 80048 BASIC METABOLIC PNL TOTAL CA: CPT

## 2022-03-16 PROCEDURE — 36415 COLL VENOUS BLD VENIPUNCTURE: CPT

## 2022-03-16 PROCEDURE — G0463: CPT

## 2022-03-16 RX ORDER — SODIUM CHLORIDE 9 MG/ML
1000 INJECTION, SOLUTION INTRAVENOUS
Refills: 0 | Status: DISCONTINUED | OUTPATIENT
Start: 2022-03-28 | End: 2022-04-11

## 2022-03-16 NOTE — H&P PST ADULT - PROBLEM SELECTOR PLAN 1
Scheduled for excision of lipoma right upper extremity  preop instruction including the use of chlorhexidine wash discussed and patient verbalized understanding

## 2022-03-16 NOTE — H&P PST ADULT - NSICDXPASTMEDICALHX_GEN_ALL_CORE_FT
PAST MEDICAL HISTORY:  Breast cancer s/p 8 weeks radiation therapy, s/p R lumpectomy in 2015    H/O lipoma     Ventral hernia without obstruction or gangrene

## 2022-03-16 NOTE — H&P PST ADULT - NSICDXPASTSURGICALHX_GEN_ALL_CORE_FT
PAST SURGICAL HISTORY:  Cataract right, lens implant    H/O lumpectomy right breast ~ Feb. 11, 2014 and axillary node dissection    S/P dilatation and curettage 2019    S/P hernia surgery ventral hernia 2016    Status post biopsy of uterine cervix

## 2022-03-16 NOTE — H&P PST ADULT - GASTROINTESTINAL DETAILS
HPI:  Pt is 58 yo female, R handed, PMH: pancreatic cyst, renal cyst, microscopic hematuria, 2016 Right Craniotomy resection of meningioma, presents for an elective  cranioplasty revision, removal of hardware and resuspension of temporalis muscle due to R temporal deformity and pain/discomfort when lays on right side of her head . Pt denies sob, cp, n/v, chills/fevers, dysuria.     Hospital Course:  10/19: s/p cranioplasty revision, removal of hardware and resuspension of temporalis muscle due to R temporal deformity. monitor ERICK drain outputs. pain control.  10/20: POD1, COOPER overnight, neuro stable. Pending CTH in AM. neuro checks q4hrs. dc'd Fentanyl.   10/21: POD2, COOPER overnight, complaining of some pain given dialudid. Per Dr. Mackay, ok to keep on NS and no DVT chemoppx. F/u SG ERICK, likely remove today. PT/OT home no needs.     Patient evaluated by PT/OT who recommended: home with no needs    Hospital course uncomplicated     Exam on day of discharge:  General: NAD, pt is comfortably sitting up in bed, A&O x3, on RA  HEENT: CN II-XII grossly intact, PERRL 3mm, EOMI b/l, face symmetric, tongue midline, neck FROM, +headwrap   Cardiovascular: RRR, normal S1 and S2   Respiratory: lungs CTAB, no wheezing, rhonchi, or crackles   GI: normoactive BS to auscultation, abd soft, NTND   Neuro: no aphasia, speech clear, no dysmetria, no pronator drift  strength 5/5 throughout all 4 extremities  sensation intact to light touch throughout   Extremities: distal pulses 2+ x4   Wound/incision: R temporal incision site headwrap C/D/I    Patient is neuro stable, vitals stable, afebrile, medically ready for discharge        
nontender

## 2022-03-16 NOTE — H&P PST ADULT - ENERGY EXPENDITURE (METS)
The Drug Utilization Report below displays all of the controlled substance prescriptions, if any, that your patient has filled in the last twelve months. The information displayed on this report is compiled from pharmacy submissions to the Department, and accurately reflects the information as submitted by the pharmacies.    This report was requested by: Xiao Blair | Reference #: [id number]    Others' Prescriptions  Patient Name: Ave Lugo Date: 1953  Address: [address] [place], [place] [id number]: Female  Rx Written	Rx Dispensed	Drug	Quantity	Days Supply	Prescriber Name	Prescriber Penelope #	Payment Method  05/06/2021	05/06/2021	alprazolam 0.5 mg tablet	30	15	Chiki-Deejay, [facility]	[id number]	Medicare  Dispenser [facility] #[id number]  04/23/2021	04/23/2021	alprazolam 0.5 mg tablet	30	15	Chiki-Deejay, [facility]	[id number]	Medicare Dispenser [facility] #[id number]  04/09/2021	04/09/2021	alprazolam 0.5 mg tablet	30	15	Chiki-Deejay, [facility]	[id number]	Medicare Dispenser [facility] #[id number]  03/26/2021	03/27/2021	alprazolam 0.5 mg tablet	30	15	[facility]	[id number]	Medicare  Dispenser [facility] #[id number]  03/11/2021	03/12/2021	alprazolam 0.5 mg tablet	30	15	[facility]Deejay, [facility]	[id number]	Medicare Dispenser [facility] #[id number]  02/27/2021	02/28/2021	alprazolam 0.5 mg tablet	30	15	Chiki-Deejay, [facility]	[id number]	Medicare Dispenser [facility] #[id number]  02/11/2021	02/13/2021	alprazolam 0.5 mg tablet	30	15	Chiki-Deejay, [facility]	[id number]	Medicare Dispenser [facility] #[id number] dasi score 8.98

## 2022-03-16 NOTE — H&P PST ADULT - HISTORY OF PRESENT ILLNESS
This is a 66 year old female with h/o breast malignancy s/p right lumpectomy followed by radiation therapy and Tamoxifen in 2015, here today for preop testing for scheduled excision of lipoma right upper extremity on 3/28/20. Pt denies any pain, no redness, no swelling.   Of note, no blood pressure measurement or blood draw from right arm.

## 2022-03-25 ENCOUNTER — OUTPATIENT (OUTPATIENT)
Dept: OUTPATIENT SERVICES | Facility: HOSPITAL | Age: 67
LOS: 1 days | End: 2022-03-25
Payer: COMMERCIAL

## 2022-03-25 DIAGNOSIS — Z98.890 OTHER SPECIFIED POSTPROCEDURAL STATES: Chronic | ICD-10-CM

## 2022-03-25 DIAGNOSIS — H26.9 UNSPECIFIED CATARACT: Chronic | ICD-10-CM

## 2022-03-25 DIAGNOSIS — Z11.52 ENCOUNTER FOR SCREENING FOR COVID-19: ICD-10-CM

## 2022-03-25 DIAGNOSIS — Z98.89 OTHER SPECIFIED POSTPROCEDURAL STATES: Chronic | ICD-10-CM

## 2022-03-25 LAB — SARS-COV-2 RNA SPEC QL NAA+PROBE: SIGNIFICANT CHANGE UP

## 2022-03-25 PROCEDURE — C9803: CPT

## 2022-03-25 PROCEDURE — U0005: CPT

## 2022-03-25 PROCEDURE — U0003: CPT

## 2022-03-27 ENCOUNTER — TRANSCRIPTION ENCOUNTER (OUTPATIENT)
Age: 67
End: 2022-03-27

## 2022-03-28 ENCOUNTER — APPOINTMENT (OUTPATIENT)
Dept: SURGERY | Facility: HOSPITAL | Age: 67
End: 2022-03-28
Payer: MEDICAID

## 2022-03-28 ENCOUNTER — TRANSCRIPTION ENCOUNTER (OUTPATIENT)
Age: 67
End: 2022-03-28

## 2022-03-28 ENCOUNTER — OUTPATIENT (OUTPATIENT)
Dept: OUTPATIENT SERVICES | Facility: HOSPITAL | Age: 67
LOS: 1 days | End: 2022-03-28
Payer: COMMERCIAL

## 2022-03-28 ENCOUNTER — RESULT REVIEW (OUTPATIENT)
Age: 67
End: 2022-03-28

## 2022-03-28 VITALS
TEMPERATURE: 97 F | WEIGHT: 166.01 LBS | SYSTOLIC BLOOD PRESSURE: 119 MMHG | HEIGHT: 67.5 IN | HEART RATE: 70 BPM | OXYGEN SATURATION: 99 % | RESPIRATION RATE: 16 BRPM | DIASTOLIC BLOOD PRESSURE: 79 MMHG

## 2022-03-28 VITALS
RESPIRATION RATE: 14 BRPM | DIASTOLIC BLOOD PRESSURE: 61 MMHG | SYSTOLIC BLOOD PRESSURE: 114 MMHG | HEART RATE: 95 BPM | OXYGEN SATURATION: 98 %

## 2022-03-28 DIAGNOSIS — Z98.89 OTHER SPECIFIED POSTPROCEDURAL STATES: Chronic | ICD-10-CM

## 2022-03-28 DIAGNOSIS — D17.21 BENIGN LIPOMATOUS NEOPLASM OF SKIN AND SUBCUTANEOUS TISSUE OF RIGHT ARM: ICD-10-CM

## 2022-03-28 DIAGNOSIS — Z98.890 OTHER SPECIFIED POSTPROCEDURAL STATES: Chronic | ICD-10-CM

## 2022-03-28 DIAGNOSIS — H26.9 UNSPECIFIED CATARACT: Chronic | ICD-10-CM

## 2022-03-28 PROCEDURE — 88304 TISSUE EXAM BY PATHOLOGIST: CPT

## 2022-03-28 PROCEDURE — 88304 TISSUE EXAM BY PATHOLOGIST: CPT | Mod: 26

## 2022-03-28 PROCEDURE — 24071 EXC ARM/ELBOW LES SC 3 CM/>: CPT

## 2022-03-28 PROCEDURE — 24071 EXC ARM/ELBOW LES SC 3 CM/>: CPT | Mod: RT

## 2022-03-28 RX ORDER — ERGOCALCIFEROL 1.25 MG/1
1 CAPSULE ORAL
Qty: 0 | Refills: 0 | DISCHARGE

## 2022-03-28 RX ORDER — CHLORHEXIDINE GLUCONATE 213 G/1000ML
1 SOLUTION TOPICAL ONCE
Refills: 0 | Status: COMPLETED | OUTPATIENT
Start: 2022-03-28 | End: 2022-03-28

## 2022-03-28 RX ORDER — ACETAMINOPHEN 500 MG
975 TABLET ORAL ONCE
Refills: 0 | Status: DISCONTINUED | OUTPATIENT
Start: 2022-03-28 | End: 2022-04-11

## 2022-03-28 RX ORDER — OXYCODONE HYDROCHLORIDE 5 MG/1
1 TABLET ORAL
Qty: 7 | Refills: 0
Start: 2022-03-28 | End: 2022-04-03

## 2022-03-28 RX ORDER — OXYCODONE HYDROCHLORIDE 5 MG/1
2.5 TABLET ORAL ONCE
Refills: 0 | Status: DISCONTINUED | OUTPATIENT
Start: 2022-03-28 | End: 2022-03-28

## 2022-03-28 RX ORDER — ONDANSETRON 8 MG/1
4 TABLET, FILM COATED ORAL ONCE
Refills: 0 | Status: DISCONTINUED | OUTPATIENT
Start: 2022-03-28 | End: 2022-04-11

## 2022-03-28 RX ORDER — OXYCODONE HYDROCHLORIDE 5 MG/1
5 TABLET ORAL ONCE
Refills: 0 | Status: DISCONTINUED | OUTPATIENT
Start: 2022-03-28 | End: 2022-03-28

## 2022-03-28 RX ORDER — LIDOCAINE HCL 20 MG/ML
0.2 VIAL (ML) INJECTION ONCE
Refills: 0 | Status: COMPLETED | OUTPATIENT
Start: 2022-03-28 | End: 2022-03-28

## 2022-03-28 RX ADMIN — SODIUM CHLORIDE 100 MILLILITER(S): 9 INJECTION, SOLUTION INTRAVENOUS at 06:31

## 2022-03-28 RX ADMIN — CHLORHEXIDINE GLUCONATE 1 APPLICATION(S): 213 SOLUTION TOPICAL at 06:44

## 2022-03-28 NOTE — ASU DISCHARGE PLAN (ADULT/PEDIATRIC) - CARE PROVIDER_API CALL
Jairo Tejada)  Surgery  [address], [address]  [place] [address]  Phone: [phone]  Fax: [phone]  Follow Up Time:

## 2022-03-28 NOTE — ASU DISCHARGE PLAN (ADULT/PEDIATRIC) - NS MD DC FALL RISK RISK
For information on Fall & Injury Prevention, visit: [email or web address] OR  [email or web address] OR  https://www.cdc.gov/steadi/patient.html

## 2022-03-28 NOTE — BRIEF OPERATIVE NOTE - OPERATION/FINDINGS
Right upper extremity lipoma excision. Sent to pathology. Incision closed with 3-0 vicryl, dermabond, prineo.

## 2022-03-28 NOTE — ASU PATIENT PROFILE, ADULT - FALL HARM RISK - UNIVERSAL INTERVENTIONS
Bed in lowest position, wheels locked, appropriate side rails in place/Call bell, personal items and telephone in reach/Instruct patient to call for assistance before getting out of bed or chair/Non-slip footwear when patient is out of bed/[place] to call system/Physically safe environment - no spills, clutter or unnecessary equipment/Purposeful Proactive Rounding/Room/bathroom lighting operational, light cord in reach

## 2022-03-28 NOTE — ASU DISCHARGE PLAN (ADULT/PEDIATRIC) - ASU DC SPECIAL INSTRUCTIONSFT
Please do not shower for 48 hours. Keep the dressing dry until then. When you do shower, do not scratch at the incision.    Please resume a regular diet.    You may take Oxycodone for severe pain, please pick it up from your pharmacy and take as needed, as directed.     Please call the number listed here to be seen by your surgeon, Dr. Tejada. You should be seen in clinic in 7-10 days. Please do not shower for 48 hours. Keep the dressing dry until then. When you do shower, do not scratch at the incision.  ********************************************************************************************   Please resume a regular diet.  ********************************************************************************************   You may take Oxycodone for severe pain, please pick it up from your pharmacy and take as needed, as directed.   ********************************************************************************************   Please call the number listed here to be seen by your surgeon, Dr. Tejada. You should be seen in clinic in 7-10 days.  ********************************************************************************************   You may take TYLENOL (acetaminophen) after ________1:30_____ PM if needed for pain. DO NOT take any additional products containing TYLENOL or ACETAMINOPHEN, such as VICODIN, PERCOCET, NORCO, EXCEDRIN, and any over-the-counter cold medications until this time. DO NOT CONSUME MORE THAN [id number] MG of TYLENOL (acetaminophen) in a 24-hour period.

## 2022-03-29 PROBLEM — Z86.018 PERSONAL HISTORY OF OTHER BENIGN NEOPLASM: Chronic | Status: ACTIVE | Noted: 2022-03-16

## 2022-04-05 LAB — SURGICAL PATHOLOGY STUDY: SIGNIFICANT CHANGE UP

## 2022-04-12 ENCOUNTER — APPOINTMENT (OUTPATIENT)
Dept: SURGERY | Facility: CLINIC | Age: 67
End: 2022-04-12
Payer: MEDICAID

## 2022-04-12 VITALS
TEMPERATURE: 96.6 F | OXYGEN SATURATION: 96 % | DIASTOLIC BLOOD PRESSURE: 66 MMHG | HEART RATE: 88 BPM | RESPIRATION RATE: 16 BRPM | SYSTOLIC BLOOD PRESSURE: 116 MMHG

## 2022-04-12 PROCEDURE — 99024 POSTOP FOLLOW-UP VISIT: CPT

## 2022-04-12 NOTE — HISTORY OF PRESENT ILLNESS
[de-identified] : Deadnra is a 66 year old female here for post op visit s/p Resection of 3 cm lipoma, right upper arm 3/28/22. Pathology; right upper extremity lipoma, resection- angiolipoma.\par Today patient reports no pain and wound looks good. Denies any other symptoms.

## 2022-06-07 ENCOUNTER — APPOINTMENT (OUTPATIENT)
Dept: HEMATOLOGY ONCOLOGY | Facility: CLINIC | Age: 67
End: 2022-06-07

## 2022-07-15 ENCOUNTER — OUTPATIENT (OUTPATIENT)
Dept: OUTPATIENT SERVICES | Facility: HOSPITAL | Age: 67
LOS: 1 days | End: 2022-07-15
Payer: MEDICARE

## 2022-07-15 ENCOUNTER — APPOINTMENT (OUTPATIENT)
Dept: ULTRASOUND IMAGING | Facility: CLINIC | Age: 67
End: 2022-07-15

## 2022-07-15 DIAGNOSIS — Z00.00 ENCOUNTER FOR GENERAL ADULT MEDICAL EXAMINATION WITHOUT ABNORMAL FINDINGS: ICD-10-CM

## 2022-07-15 DIAGNOSIS — Z85.3 PERSONAL HISTORY OF MALIGNANT NEOPLASM OF BREAST: ICD-10-CM

## 2022-07-15 DIAGNOSIS — Z98.890 OTHER SPECIFIED POSTPROCEDURAL STATES: Chronic | ICD-10-CM

## 2022-07-15 DIAGNOSIS — Z98.89 OTHER SPECIFIED POSTPROCEDURAL STATES: Chronic | ICD-10-CM

## 2022-07-15 DIAGNOSIS — R59.9 ENLARGED LYMPH NODES, UNSPECIFIED: ICD-10-CM

## 2022-07-15 DIAGNOSIS — H26.9 UNSPECIFIED CATARACT: Chronic | ICD-10-CM

## 2022-07-15 DIAGNOSIS — Z00.8 ENCOUNTER FOR OTHER GENERAL EXAMINATION: ICD-10-CM

## 2022-07-15 PROCEDURE — 76882 US LMTD JT/FCL EVL NVASC XTR: CPT

## 2022-07-15 PROCEDURE — 76882 US LMTD JT/FCL EVL NVASC XTR: CPT | Mod: 26,LT

## 2022-08-24 NOTE — HISTORY OF PRESENT ILLNESS
normal mood with appropriate affect [Disease: _____________________] : Disease: [unfilled] [T: ___] : T[unfilled] [N: ___] : N[unfilled] [M: ___] : M[unfilled] [AJCC Stage: ____] : AJCC Stage: [unfilled] [Treatment Protocol] : Treatment Protocol [Date: ____________] : Patient's last distress assessment performed on [unfilled]. 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 At age 58 years, a screening mammogram 10/22/2013 demonstrated distortion in the right upper quadrant of the breast which confirmatory ultrasound 11/14/2013.  Ultrasound core needle biopsy 12/06/2013 positive for a low grade carcinoma a right breast invasive duct cell carcinoma treated in 2/13/2014 with lumpectomy and radiation. The primary was 0.9 cm with a negative sentinel node ER+ KS+ Her2/amanda negative (Accession # [id number]) . The posterior margin was positive. She had 6 months of hormone replacement therapy.  She was placed on tamoxifen, developed significant hot flashes for 2 1/2 years, and followed at [facility] by Dr. Celso Wright. 2014 bone density WNL. Her major problem was endometrial hyperplasia requiring GYN follow up and D&C. She was resistant to switching adjuvant therapy to an AI..   8/07/2017 breast MRI negative; mammogram benign calcifications. Mammogram/sonogram 9/2018 negative. \par \par Bone density 1/29/2018: T = 0.2 femur. Finished My 11, 2019. Followed by GYN for endometrial hyperplasia. 7/13/2019 D&C benign.  [de-identified] : ER+ 100%  SC+80%  Her2/amanda negative [de-identified] : 0.9 cm primary; 0/1 sentinel nodes; Grade 1\par right lumpectomy 2014 [FreeTextEntry1] : tamoxifen start May 2014 -- finished May 11 2019.  [de-identified] : doing well

## 2022-08-29 ENCOUNTER — APPOINTMENT (OUTPATIENT)
Dept: ORTHOPEDIC SURGERY | Facility: CLINIC | Age: 67
End: 2022-08-29

## 2022-08-29 VITALS — BODY MASS INDEX: 25.11 KG/M2 | HEIGHT: 67 IN | WEIGHT: 160 LBS

## 2022-08-29 PROCEDURE — 99214 OFFICE O/P EST MOD 30 MIN: CPT | Mod: 25

## 2022-08-29 PROCEDURE — 20604 DRAIN/INJ JOINT/BURSA W/US: CPT | Mod: 50

## 2022-08-29 NOTE — PHYSICAL EXAM
[de-identified] : Constitutional: Well-nourished, well-developed, No acute distress\par Respiratory: Good respiratory effort, no SOB\par Lymphatic: No regional lymphadenopathy, no lymphedema\par Psychiatric: Pleasant and normal affect, alert and oriented x3\par Skin: Clean dry and intact B/L UE\par Musculoskeletal: normal except where as noted in regional exam\par \par Right Hand:\par APPEARANCE: No swelling, no marked deformities or malalignment of the fingers\par POSITIVE TENDERNESS: + 1st CMC joint\par NONTENDER: all other osseous and ligamentous structures. \par ROM: + Crepitus and mild limitation of the first CMC joint, otherwise full & painless in CMC, MCP, and IP joints. \par RESISTIVE TESTING: painless resisted testing. \par SPECIAL TESTS: normal sensation of 1st through 5th digits, normal flex/ext, abd/add, and opposition of thumb, no triggering of fingers\par Vasc: 2+ radial pulse, normal capillary refill\par Neuro: AIN, PIN, Ulnar nerve intact to motor\par Sensation: Intact to light touch throughout\par \par Left Hand:\par APPEARANCE: No swelling, no marked deformities or malalignment of the fingers\par POSITIVE TENDERNESS: + 1st CMC joint\par NONTENDER: all other osseous and ligamentous structures. \par ROM: + Crepitus and mild limitation of the first CMC joint, otherwise full & painless in CMC, MCP, and IP joints. \par RESISTIVE TESTING: painless resisted testing. \par SPECIAL TESTS: normal sensation of 1st through 5th digits, normal flex/ext, abd/add, and opposition of thumb, no triggering of fingers\par Vasc: 2+ radial pulse, normal capillary refill\par Neuro: AIN, PIN, Ulnar nerve intact to motor\par Sensation: Intact to light touch throughout\par

## 2022-08-29 NOTE — PROCEDURE
[de-identified] : Ultrasound Guided Injection \par Indication: Ensure placement within a small osteoarthritic joint\par \par Utlizing the Sonosite II Edge portable ultrasound machine, the Linear 25mm 10-5 MHz transducer, sterile probe cover and sterile ultrasound gel, ultrasound guidance with the probe in the long axis, utilizing an in-plane approach, was used for the following injection:\par \par Injection: Cortisone injection to the left 1st CMC joint\par Indication: Osteoarthritis.\par \par A discussion was had with the patient regarding this procedure and all questions were answered. All risks, benefits and alternatives were discussed. These included but were not limited to bleeding, infection, and allergic reaction. A timeout was done to ensure correct side and patient agreed to the procedure.  A [place] diogenes was created on the skin utilizing a plastic needle cap to diogenes the anticipated point of entry.  Alcohol was used to clean and sterilize the skin over the lateral CMC joint. A 25-gauge needle was used to inject 0.5cc of 0.25% bupivacaine and 1cc of 6mg/mL betamethasone into the joint. A sterile bandage was then applied. The patient tolerated the procedure well and there were no complications. \par ______________________________\par Ultrasound Guided Injection \par Indication: Ensure placement within a small osteoarthritic joint\par \par Utlizing the Sonosite II Edge portable ultrasound machine, the Linear 25mm 10-5 MHz transducer, sterile probe cover and sterile ultrasound gel, ultrasound guidance with the probe in the long axis, utilizing an in-plane approach, was used for the following injection:\par \par Injection: Cortisone injection to the right 1st CMC joint\par Indication: Osteoarthritis.\par \par A discussion was had with the patient regarding this procedure and all questions were answered. All risks, benefits and alternatives were discussed. These included but were not limited to bleeding, infection, and allergic reaction. A timeout was done to ensure correct side and patient agreed to the procedure.  A [place] diogenes was created on the skin utilizing a plastic needle cap to diogenes the anticipated point of entry.  Alcohol was used to clean and sterilize the skin over the lateral CMC joint. A 25-gauge needle was used to inject 0.5cc of 0.25% bupivacaine and 1cc of 6mg/mL betamethasone into the joint. A sterile bandage was then applied. The patient tolerated the procedure well and there were no complications. \par \par

## 2022-08-29 NOTE — DISCUSSION/SUMMARY
[de-identified] : Patient was seen today for reevaluation of chronic bilateral hand pain with recent atraumatic exacerbation secondary to severe CMC osteoarthritis.  Patient is aware that the permanent resolution option for this would be surgical intervention, she is more open to the idea of hand surgery consultation at this time, but is looking for at least some short-term pain relief.  We discussed various treatment options as well as associated risk/benefits/alternatives and patient elected to proceed with repeat bilateral ultrasound-guided cortisone injections today (see procedure note).  Informed the patient that the numbing medicine in today's injection will last for about 4-6 hours. The steroid that was injected will start to work in 1 to 2 days, peak at 1-2 weeks, and may last up to 1-2 months.  Patient started on a course of topical NSAIDs, prescription given for diclofenac gel 1% to be applied to the area of pain 2-3 times daily as needed (We discussed all possible side effects of this medication).  Follow up as needed.  Patient and spouse appreciate and agree with current plan.\par \par \par This note was generated using dragon medical dictation software.  A reasonable effort has been made for proofreading its contents, but typos may still remain.  If there are any questions or points of clarification needed please notify my office.

## 2022-08-29 NOTE — HISTORY OF PRESENT ILLNESS
[de-identified] : Patient is here for b/l hand pain follow up. She had relief from last injection. Pain returned without injury. She is interested in repeating at this time.

## 2022-08-31 ENCOUNTER — RX CHANGE (OUTPATIENT)
Age: 67
End: 2022-08-31

## 2022-12-01 ENCOUNTER — OUTPATIENT (OUTPATIENT)
Dept: OUTPATIENT SERVICES | Facility: HOSPITAL | Age: 67
LOS: 1 days | End: 2022-12-01
Payer: MEDICARE

## 2022-12-01 ENCOUNTER — APPOINTMENT (OUTPATIENT)
Dept: MAMMOGRAPHY | Facility: CLINIC | Age: 67
End: 2022-12-01

## 2022-12-01 DIAGNOSIS — Z98.890 OTHER SPECIFIED POSTPROCEDURAL STATES: Chronic | ICD-10-CM

## 2022-12-01 DIAGNOSIS — Z00.8 ENCOUNTER FOR OTHER GENERAL EXAMINATION: ICD-10-CM

## 2022-12-01 DIAGNOSIS — Z00.00 ENCOUNTER FOR GENERAL ADULT MEDICAL EXAMINATION WITHOUT ABNORMAL FINDINGS: ICD-10-CM

## 2022-12-01 DIAGNOSIS — H26.9 UNSPECIFIED CATARACT: Chronic | ICD-10-CM

## 2022-12-01 DIAGNOSIS — Z98.89 OTHER SPECIFIED POSTPROCEDURAL STATES: Chronic | ICD-10-CM

## 2022-12-01 PROCEDURE — 77063 BREAST TOMOSYNTHESIS BI: CPT | Mod: 26

## 2022-12-01 PROCEDURE — 77067 SCR MAMMO BI INCL CAD: CPT

## 2022-12-01 PROCEDURE — 77063 BREAST TOMOSYNTHESIS BI: CPT

## 2022-12-01 PROCEDURE — 77067 SCR MAMMO BI INCL CAD: CPT | Mod: 26

## 2023-01-03 ENCOUNTER — RX RENEWAL (OUTPATIENT)
Age: 68
End: 2023-01-03

## 2023-02-13 ENCOUNTER — APPOINTMENT (OUTPATIENT)
Dept: ORTHOPEDIC SURGERY | Facility: CLINIC | Age: 68
End: 2023-02-13
Payer: MEDICARE

## 2023-02-13 VITALS — WEIGHT: 170 LBS | BODY MASS INDEX: 26.68 KG/M2 | HEIGHT: 67 IN

## 2023-02-13 PROCEDURE — 99214 OFFICE O/P EST MOD 30 MIN: CPT | Mod: 25

## 2023-02-13 PROCEDURE — 20605 DRAIN/INJ JOINT/BURSA W/O US: CPT | Mod: 50

## 2023-02-13 NOTE — HISTORY OF PRESENT ILLNESS
[de-identified] : Patient is here for b/l hand pain follow up. Patient had relief from injections. Pain returned recently without injury. She is using Lidocaine cream to treat it. She is requesting repeat injections.

## 2023-02-13 NOTE — PROCEDURE
[de-identified] : Ultrasound Guided Injection \par Indication: Ensure placement within a small osteoarthritic joint\par \par Utlizing the Sonosite II Edge portable ultrasound machine, the Linear 25mm 10-5 MHz transducer, sterile probe cover and sterile ultrasound gel, ultrasound guidance with the probe in the long axis, utilizing an in-plane approach, was used for the following injection:\par \par Injection: Cortisone injection to the left 1st CMC joint\par Indication: Osteoarthritis.\par \par A discussion was had with the patient regarding this procedure and all questions were answered. All risks, benefits and alternatives were discussed. These included but were not limited to bleeding, infection, and allergic reaction. A timeout was done to ensure correct side and patient agreed to the procedure.  A [place] diogenes was created on the skin utilizing a plastic needle cap to diogenes the anticipated point of entry.  Alcohol was used to clean and sterilize the skin over the lateral CMC joint. A 25-gauge needle was used to inject 0.5cc of 0.25% bupivacaine and 1cc of 6mg/mL betamethasone into the joint. A sterile bandage was then applied. The patient tolerated the procedure well and there were no complications. \par ______________________________\par Ultrasound Guided Injection \par Indication: Ensure placement within a small osteoarthritic joint\par \par Utlizing the Sonosite II Edge portable ultrasound machine, the Linear 25mm 10-5 MHz transducer, sterile probe cover and sterile ultrasound gel, ultrasound guidance with the probe in the long axis, utilizing an in-plane approach, was used for the following injection:\par \par Injection: Cortisone injection to the right 1st CMC joint\par Indication: Osteoarthritis.\par \par A discussion was had with the patient regarding this procedure and all questions were answered. All risks, benefits and alternatives were discussed. These included but were not limited to bleeding, infection, and allergic reaction. A timeout was done to ensure correct side and patient agreed to the procedure.  A [place] diogenes was created on the skin utilizing a plastic needle cap to diogenes the anticipated point of entry.  Alcohol was used to clean and sterilize the skin over the lateral CMC joint. A 25-gauge needle was used to inject 0.5cc of 0.25% bupivacaine and 1cc of 6mg/mL betamethasone into the joint. A sterile bandage was then applied. The patient tolerated the procedure well and there were no complications. \par \par

## 2023-02-13 NOTE — PHYSICAL EXAM
[de-identified] : Constitutional: Well-nourished, well-developed, No acute distress\par Respiratory: Good respiratory effort, no SOB\par Lymphatic: No regional lymphadenopathy, no lymphedema\par Psychiatric: Pleasant and normal affect, alert and oriented x3\par Skin: Clean dry and intact B/L UE/LE\par Musculoskeletal: normal except where as noted in regional exam\par \par Right Hand:\par APPEARANCE: No swelling, no marked deformities or malalignment of the fingers\par POSITIVE TENDERNESS: + 1st CMC joint\par NONTENDER: all other osseous and ligamentous structures. \par ROM: + Crepitus and mild limitation of the first CMC joint, otherwise full & painless in CMC, MCP, and IP joints. \par RESISTIVE TESTING: painless resisted testing. \par SPECIAL TESTS: normal sensation of 1st through 5th digits, normal flex/ext, abd/add, and opposition of thumb, no triggering of fingers\par Vasc: 2+ radial pulse, normal capillary refill\par Neuro: AIN, PIN, Ulnar nerve intact to motor\par Sensation: Intact to light touch throughout\par \par Left Hand:\par APPEARANCE: No swelling, no marked deformities or malalignment of the fingers\par POSITIVE TENDERNESS: + 1st CMC joint\par NONTENDER: all other osseous and ligamentous structures. \par ROM: + Crepitus and mild limitation of the first CMC joint, otherwise full & painless in CMC, MCP, and IP joints. \par RESISTIVE TESTING: painless resisted testing. \par SPECIAL TESTS: normal sensation of 1st through 5th digits, normal flex/ext, abd/add, and opposition of thumb, no triggering of fingers\par Vasc: 2+ radial pulse, normal capillary refill\par Neuro: AIN, PIN, Ulnar nerve intact to motor\par Sensation: Intact to light touch throughout\par \par \par \par

## 2023-02-13 NOTE — DISCUSSION/SUMMARY
[de-identified] : Patient was seen today for reevaluation of chronic bilateral hand pain with recent atraumatic exacerbation secondary to severe CMC osteoarthritis. Patient is aware that the permanent resolution option for this would be surgical intervention, she is more open to the idea of hand surgery consultation at this time, but is looking for at least some short-term pain relief. We discussed various treatment options as well as associated risk/benefits/alternatives and patient elected to proceed with repeat bilateral ultrasound-guided cortisone injections today (see procedure note). Informed the patient that the numbing medicine in today's injection will last for about 4-6 hours. The steroid that was injected will start to work in 1 to 2 days, peak at 1-2 weeks, and may last up to 1-2 months. Follow up as needed. Patient and spouse appreciate and agree with current plan.\par \par \par This note was generated using dragon medical dictation software. A reasonable effort has been made for proofreading its contents, but typos may still remain. If there are any questions or points of clarification needed please notify my office. \par

## 2023-02-14 ENCOUNTER — APPOINTMENT (OUTPATIENT)
Dept: ULTRASOUND IMAGING | Facility: CLINIC | Age: 68
End: 2023-02-14
Payer: MEDICARE

## 2023-02-14 ENCOUNTER — OUTPATIENT (OUTPATIENT)
Dept: OUTPATIENT SERVICES | Facility: HOSPITAL | Age: 68
LOS: 1 days | End: 2023-02-14
Payer: MEDICARE

## 2023-02-14 DIAGNOSIS — Z00.00 ENCOUNTER FOR GENERAL ADULT MEDICAL EXAMINATION WITHOUT ABNORMAL FINDINGS: ICD-10-CM

## 2023-02-14 DIAGNOSIS — H26.9 UNSPECIFIED CATARACT: Chronic | ICD-10-CM

## 2023-02-14 DIAGNOSIS — Z98.890 OTHER SPECIFIED POSTPROCEDURAL STATES: Chronic | ICD-10-CM

## 2023-02-14 DIAGNOSIS — Z98.89 OTHER SPECIFIED POSTPROCEDURAL STATES: Chronic | ICD-10-CM

## 2023-02-14 PROCEDURE — 76641 ULTRASOUND BREAST COMPLETE: CPT | Mod: 26,50

## 2023-02-14 PROCEDURE — 76641 ULTRASOUND BREAST COMPLETE: CPT

## 2023-03-17 NOTE — H&P ADULT. - PROBLEM/PLAN-2
Patient: MARK YOUSSEF    MR# [id number]    Time of Service: 3/17/2023  4:45 AM     Chief Complaint   Patient presents with   • Wheezing     asthma       History of Present Illness    Mark Youssef is a 21 year old female who presents to the ED with wheezing and shortness of breath that has been getting progressively worse over the past week.  She has a history of asthma and states that she has been taking her inhaler more frequently with minimal relief.  She has had a cough and a runny nose.  She denies fever, chills, chest pain, nausea, vomiting, abdominal pain, diarrhea, dysuria, flank pain.      History provided by (including independent historians): Patient    Review of Systems    All systems reviewed and are negative unless documented in the HPI.     Past Medical/Surgical History    Past Medical History:   Diagnosis Date   • Asthma    • No known problems    • RAD (reactive airway disease)        Past Surgical History:   Procedure Laterality Date   • No past surgeries         Medication list on file    Current Outpatient Medications   Medication Instructions   • albuterol 108 (90 Base) MCG/ACT inhaler No dose, route, or frequency recorded.   • albuterol 108 (90 Base) MCG/ACT inhaler 2 puffs, Inhalation, EVERY 4 HOURS PRN   • predniSONE (DELTASONE) 20 MG tablet 40 mg po qd x 3days to start tomorrow       Allergies    ALLERGIES:   Allergen Reactions   • Dog Dander Other (See Comments)       Family/Social History/Social Determinants of Health    Family History   Problem Relation Age of Onset   • Sickle Cell Disease Mother    • Patient is unaware of any medical problems Father        Social History     Tobacco Use   • Smoking status: Never   • Smokeless tobacco: Never   Substance Use Topics   • Alcohol use: Never   • Drug use: Never       Social Determinants of Health     Intimate Partner Violence: Not At Risk   • Social Determinants: Intimate Partner Violence Past Fear: No   • Social Determinants: Intimate Partner  Violence Current Fear: No   Social Connections: Not on file   Alcohol Use: Not on file   Tobacco Use: Low Risk    • Smoking Tobacco Use: Never   • Smokeless Tobacco Use: Never   • Passive Exposure: Not on file   Financial Resource Strain: Not on file   Stress: Not on file   Physical Activity: Not on file   Food Insecurity: Not on file   Transportation Needs: Not on file   Inadequate Housing: Not on file   Depression: Not on file        Physical Exam    Vitals:    03/17/23 0350 03/17/23 0518   BP: 100/69 99/83   Pulse: (!) 127 (!) 116   Resp: (!) 22 20   Temp: 98.9 °F (37.2 °C)    SpO2: 96% 98%   LMP: 11/04/2022       Pulse Oximetry: 99% on room air which is normal (independently reviewed and interpreted by me)    Available triage notes, nursing notes and vital signs reviewed by me.    Constitutional:  Appears well-developed and well-nourished.    Head: Normocephalic and atraumatic.    Nose: Normal.    Mouth/Throat: Oropharynx is clear and moist.     Eyes: Conjunctivae are normal. Pupils are equal, round, and reactive to light. No scleral icterus. EOMI.    Ears: External ears without gross abnormalities.    Neck: Normal range of motion. No tracheal deviation present.    Cardiovascular: Normal rate, regular rhythm.  Normal peripheral pulses.  Capillary refill less than 2 seconds.  No edema.    Respiratory/Chest: Normal respiratory effort. Normal chest expansion. Bilateral breath sounds clear and equal.  1+ wheeze. Good air entry.    GI: Soft. Not distended. No focal tenderness.  Normal bowel sounds.    : No flank tenderness.    Musculoskeletal: Normal ROM.  No obvious deformity or swelling.    Neurological: Awake and alert, strength and sensation grossly intact, speech is fluent.  No focal neurologic deficits.     Skin: Skin is warm and dry, no obvious rash.     Psych: Appropriate mood and affect for condition      Diagnostics:    I have independently reviewed and interpreted the rhythm strip as: sinus  tachycardia    I have independently reviewed and interpreted the EKG as:          Laboratory summary     Results for orders placed or performed during the hospital encounter of 03/17/23   COVID/Flu/RSV panel   Result Value Ref Range    Rapid SARS-COV-2 by PCR Not Detected Not Detected / Detected / Presumptive Positive / Inhibitors present    Influenza A by PCR Not Detected Not Detected    Influenza B by PCR Not Detected Not Detected    RSV BY PCR Not Detected Not Detected    Isolation Guidelines      Procedural Comment         Laboratory results above independently reviewed and interpreted by me.       Radiography/Imaging    XR CHEST PA OR AP 1 VIEW   Final Result   1.    No acute cardiopulmonary findings.      Electronically Signed by: KASSY VELIZ M.D.    Signed on: 3/17/2023 6:59 AM    Workstation ID: [id number]          Imaging result above independently reviewed and interpreted by me.        Medical Decision Making including ED Course and Interventions    Assessment:    Mariana Youssef is a 21 year old female who presents to the ED with wheezing and shortness of breath that has been getting progressively worse over the past week.  She has a history of asthma and states that she has been taking her inhaler more frequently with minimal relief.  She has had a cough and a runny nose.  She denies fever, chills, chest pain, nausea, vomiting, abdominal pain, diarrhea, dysuria, flank pain.    Patient is in no distress.  Bilateral breath sounds clear and equal.  1+ wheeze.  Good air entry.  Well-perfused with normal peripheral pulses and capillary refill less than 2 seconds. Abdomen soft and nontender, no guarding.  Normal bowel sounds.  No focal neurologic deficits.         Consideration of multiple diagnoses including but not limited to: Asthma exacerbation, viral URI, pneumonia.       Comorbidities/chronic illnesses impacting care: Asthma       Explanation of tests and/or treatment considered, ordered or performed:  COVID/flu/RSV negative. CXR negative for infiltrate, effusion, pneumothorax.    Patient presents with cough, rhinorrhea, wheezing that has been worsening for the past week.  She has a history of asthma and her symptoms or not relieved with her normal albuterol rescue inhaler.  She states that her asthma has been getting progressively worse over the past several months.  She does not take any other asthma control medications.  She was treated with albuterol inhaler 4 puffs x 2, methylprednisolone 125 mg IV, IV fluid bolus.  Following this treatment she stated that she was feeling much better and was comfortable going home.  Her wheezing had resolved.  Discussed with her follow-up with her primary care provider for possible escalation in her asthma management.  Encouraged her to return for any new or worsening symptoms.  She verbalized her understanding and agreed with this plan.        Orders Placed in the ED  Orders Placed This Encounter   • XR CHEST PA OR AP 1 VIEW   • COVID/Flu/RSV panel   • Contact and droplet isolation status   • DISCONTD: ipratropium-albuterol (DUONEB) 0.5-2.5 (3) MG/3ML nebulizer solution 3 mL   • methylPREDNISolone (SOLU-Medrol) PF injection 125 mg   • sodium chloride (NORMAL SALINE) 0.9 % bolus 1,000 mL   • albuterol inhaler 4 puff   • albuterol inhaler 4 puff   • methylPREDNISolone (MEDROL DOSEPAK) 4 MG tablet   • albuterol 108 (90 Base) MCG/ACT inhaler         Reassessment    ED Course as of 03/17/23 1008   Fri Mar 17, 2023   0848 Breath sounds clear with minimal wheezing. Pt states that she is feeling \"much better.\" [GS]   0930 Breath sounds clear with no wheezing.  Good air entry.  Patient states that she is feeling better and feels like she will be able to go home. [GS]      ED Course User Index  [GS] Simon Tinoco CNP       External records reviewed and documented as above.    Care affected by social determinants of health as documented above.    I personally considered  admission/escalation of hospital level care vs discharge vs other disposition from the ED.    Disposition    Time: 0935    Discharged to Home .    Prescriptions:     Summary of your Discharge Medications      Take these Medications      Details   * albuterol 108 (90 Base) MCG/ACT inhaler      * albuterol 108 (90 Base) MCG/ACT inhaler   Inhale 2 puffs into the lungs every 4 hours as needed for Wheezing.     * albuterol 108 (90 Base) MCG/ACT inhaler   Inhale 2 puffs into the lungs every 4 hours as needed for Wheezing.     methylPREDNISolone 4 MG tablet  Commonly known as: MEDROL DOSEPAK   Take 1 tablet by mouth as directed. follow package directions         * This list has 3 medication(s) that are the same as other medications prescribed for you. Read the directions carefully, and ask your doctor or other care provider to review them with you.                  Patient Care Instructions:   1. Vicentemes instructions were provided for asthma exacerbation.      Follow Up Plan:  1.    Follow-up Information     Follow up With Specialties Details Why Contact Info    Frandy Campuzano MD Pediatrics In 2 days  [address]  [address]  [facility] [address]  [phone]             2.  Follow up is needed :   > for re-examination.    3.  Recommended returning to the ED for any change in symptoms or status.      I have discussed the patient's presentation with other health care provider:         Prescriptions given or considered    New Prescriptions    ALBUTEROL 108 (90 BASE) MCG/ACT INHALER    Inhale 2 puffs into the lungs every 4 hours as needed for Wheezing.    METHYLPREDNISOLONE (MEDROL DOSEPAK) 4 MG TABLET    Take 1 tablet by mouth as directed. follow package directions       Patient instructed and strongly encouraged to return immediately to the Emergency Department for any new, persistent, recurrent or worsening symptoms.  The patient was instructed to follow up with a primary care doctor or specialist as soon as possible.  Written aftercare and follow-up instructions were discussed with and acknowledged by the patient.    FINAL CLINICAL IMPRESSION    1. Exacerbation of asthma, unspecified asthma severity, unspecified whether persistent        3/17/2023  Simon Tinoco, HOLLI Tinoco, HOLLI  03/17/23 100     DISPLAY PLAN FREE TEXT

## 2023-04-26 NOTE — ASU PATIENT PROFILE, ADULT - ATTEMPT TO OOB

## 2023-05-01 ENCOUNTER — APPOINTMENT (OUTPATIENT)
Dept: INTERNAL MEDICINE | Facility: CLINIC | Age: 68
End: 2023-05-01
Payer: MEDICARE

## 2023-05-01 VITALS
SYSTOLIC BLOOD PRESSURE: 130 MMHG | WEIGHT: 179 LBS | BODY MASS INDEX: 28.09 KG/M2 | HEART RATE: 80 BPM | DIASTOLIC BLOOD PRESSURE: 79 MMHG | OXYGEN SATURATION: 99 % | RESPIRATION RATE: 18 BRPM | HEIGHT: 67 IN | TEMPERATURE: 97.6 F

## 2023-05-01 PROCEDURE — 99214 OFFICE O/P EST MOD 30 MIN: CPT

## 2023-05-01 NOTE — PHYSICAL EXAM
[No Acute Distress] : no acute distress [Well Nourished] : well nourished [Well Developed] : well developed [Well-Appearing] : well-appearing [Normal Sclera/Conjunctiva] : normal sclera/conjunctiva [PERRL] : pupils equal round and reactive to light [EOMI] : extraocular movements intact [Normal Outer Ear/Nose] : the outer ears and nose were normal in appearance [Normal Oropharynx] : the oropharynx was normal [Normal Nasal Mucosa] : the nasal mucosa was normal [No Respiratory Distress] : no respiratory distress  [No Accessory Muscle Use] : no accessory muscle use [Clear to Auscultation] : lungs were clear to auscultation bilaterally [Normal Rate] : normal rate  [Regular Rhythm] : with a regular rhythm [Normal S1, S2] : normal S1 and S2 [No Murmur] : no murmur heard [Coordination Grossly Intact] : coordination grossly intact [No Focal Deficits] : no focal deficits [Normal Gait] : normal gait [Normal Affect] : the affect was normal [Normal Insight/Judgement] : insight and judgment were intact [de-identified] : right ear slight distortion of cone of light, left normal

## 2023-05-01 NOTE — PLAN
[FreeTextEntry1] : Plan as above. Rx sent. Pt advised to sign up for [facility] portal to review labs and communicate any questions or concerns directly. Yearly physical and return as needed for illness, medication refills, and new or existing complaints.

## 2023-05-09 ENCOUNTER — APPOINTMENT (OUTPATIENT)
Dept: ORTHOPEDIC SURGERY | Facility: CLINIC | Age: 68
End: 2023-05-09
Payer: MEDICARE

## 2023-05-09 PROCEDURE — 99214 OFFICE O/P EST MOD 30 MIN: CPT | Mod: 25

## 2023-05-09 PROCEDURE — 20604 DRAIN/INJ JOINT/BURSA W/US: CPT | Mod: 50

## 2023-05-10 NOTE — PHYSICAL EXAM
[de-identified] : Constitutional: Well-nourished, well-developed, No acute distress\par Respiratory: Good respiratory effort, no SOB\par Lymphatic: No regional lymphadenopathy, no lymphedema\par Psychiatric: Pleasant and normal affect, alert and oriented x3\par Skin: Clean dry and intact B/L UE\par Musculoskeletal: normal except where as noted in regional exam\par \par Right Hand:\par APPEARANCE: No swelling, no marked deformities or malalignment of the fingers\par POSITIVE TENDERNESS: + 1st CMC joint\par NONTENDER: all other osseous and ligamentous structures. \par ROM: + Crepitus and mild limitation of the first CMC joint, otherwise full & painless in CMC, MCP, and IP joints. \par RESISTIVE TESTING: painless resisted testing. \par SPECIAL TESTS: normal sensation of 1st through 5th digits, normal flex/ext, abd/add, and opposition of thumb, no triggering of fingers\par Vasc: 2+ radial pulse, normal capillary refill\par Neuro: AIN, PIN, Ulnar nerve intact to motor\par Sensation: Intact to light touch throughout\par \par Left Hand:\par APPEARANCE: No swelling, no marked deformities or malalignment of the fingers\par POSITIVE TENDERNESS: + 1st CMC joint\par NONTENDER: all other osseous and ligamentous structures. \par ROM: + Crepitus and mild limitation of the first CMC joint, otherwise full & painless in CMC, MCP, and IP joints. \par RESISTIVE TESTING: painless resisted testing. \par SPECIAL TESTS: normal sensation of 1st through 5th digits, normal flex/ext, abd/add, and opposition of thumb, no triggering of fingers\par Vasc: 2+ radial pulse, normal capillary refill\par Neuro: AIN, PIN, Ulnar nerve intact to motor\par Sensation: Intact to light touch throughout\par \par \par \par

## 2023-05-10 NOTE — PROCEDURE
[de-identified] : Ultrasound Guided Injection \par Indication: Ensure placement within a small osteoarthritic joint\par \par Utlizing the Sonosite II Edge portable ultrasound machine, the Linear 25mm 10-5 MHz transducer, sterile probe cover and sterile ultrasound gel, ultrasound guidance with the probe in the long axis, utilizing an in-plane approach, was used for the following injection:\par \par Injection: Cortisone injection to the left 1st CMC joint\par Indication: Osteoarthritis.\par \par A discussion was had with the patient regarding this procedure and all questions were answered. All risks, benefits and alternatives were discussed. These included but were not limited to bleeding, infection, and allergic reaction. A timeout was done to ensure correct side and patient agreed to the procedure.  A [place] diogenes was created on the skin utilizing a plastic needle cap to diogenes the anticipated point of entry.  Alcohol was used to clean and sterilize the skin over the lateral CMC joint. A 25-gauge needle was used to inject 0.5cc of 0.25% bupivacaine and 1cc of 6mg/mL betamethasone into the joint. A sterile bandage was then applied. The patient tolerated the procedure well and there were no complications. \par ______________________________\par Ultrasound Guided Injection \par Indication: Ensure placement within a small osteoarthritic joint\par \par Utlizing the Sonosite II Edge portable ultrasound machine, the Linear 25mm 10-5 MHz transducer, sterile probe cover and sterile ultrasound gel, ultrasound guidance with the probe in the long axis, utilizing an in-plane approach, was used for the following injection:\par \par Injection: Cortisone injection to the right 1st CMC joint\par Indication: Osteoarthritis.\par \par A discussion was had with the patient regarding this procedure and all questions were answered. All risks, benefits and alternatives were discussed. These included but were not limited to bleeding, infection, and allergic reaction. A timeout was done to ensure correct side and patient agreed to the procedure.  A [place] diogenes was created on the skin utilizing a plastic needle cap to diogenes the anticipated point of entry.  Alcohol was used to clean and sterilize the skin over the lateral CMC joint. A 25-gauge needle was used to inject 0.5cc of 0.25% bupivacaine and 1cc of 6mg/mL betamethasone into the joint. A sterile bandage was then applied. The patient tolerated the procedure well and there were no complications. \par \par

## 2023-05-10 NOTE — DISCUSSION/SUMMARY
[de-identified] : Patient was seen today for reevaluation of chronic bilateral hand pain with recent atraumatic exacerbation secondary to severe CMC osteoarthritis. Patient is aware that the permanent resolution option for this would be surgical intervention, she is still hesitant regarding the idea of hand surgery consultation at this time, but is looking for pain relief. We discussed various treatment options as well as associated risk/benefits/alternatives and patient elected to proceed with repeat bilateral ultrasound-guided cortisone injections today (see procedure note). Informed the patient that the numbing medicine in today's injection will last for about 4-6 hours. The steroid that was injected will start to work in 1 to 2 days, peak at 1-2 weeks, and may last up to 1-2 months. Follow up as needed. Patient and spouse appreciate and agree with current plan.\par \par \par This note was generated using dragon medical dictation software. A reasonable effort has been made for proofreading its contents, but typos may still remain. If there are any questions or points of clarification needed please notify my office. \par

## 2023-05-10 NOTE — HISTORY OF PRESENT ILLNESS
[de-identified] : Patient is here for b/l hand pain follow up. She reports flare up of her chronic B/L CMC joint arthritis, she last had steroid injections bilaterally on 2/13/23 which started wearing off last week. She uses diclofenac 50mg BID and topical lidocaine for pain relief. She denies any trauma or numbness and tingling. She has not done any PT in the past.

## 2023-05-10 NOTE — PRE-ANESTHESIA EVALUATION ADULT - NSANTHBPHIGHRD_ENT_A_CORE
MD consult for discharge planning received.  Case discussed in OFT's and chart reviewed.  Therapy recommending return to home with no needs.  Conferred with MD, no discharge need anticipated.  Full assessment deferred.  Please re-consult SW if other needs arise.   No

## 2023-05-22 ENCOUNTER — RX RENEWAL (OUTPATIENT)
Age: 68
End: 2023-05-22

## 2023-05-22 ENCOUNTER — APPOINTMENT (OUTPATIENT)
Dept: INTERNAL MEDICINE | Facility: CLINIC | Age: 68
End: 2023-05-22
Payer: MEDICARE

## 2023-05-22 ENCOUNTER — NON-APPOINTMENT (OUTPATIENT)
Age: 68
End: 2023-05-22

## 2023-05-22 VITALS
BODY MASS INDEX: 27.29 KG/M2 | TEMPERATURE: 97.5 F | SYSTOLIC BLOOD PRESSURE: 123 MMHG | DIASTOLIC BLOOD PRESSURE: 76 MMHG | OXYGEN SATURATION: 97 % | HEART RATE: 70 BPM | HEIGHT: 67.72 IN | WEIGHT: 178 LBS

## 2023-05-22 DIAGNOSIS — E78.00 PURE HYPERCHOLESTEROLEMIA, UNSPECIFIED: ICD-10-CM

## 2023-05-22 DIAGNOSIS — Z01.818 ENCOUNTER FOR OTHER PREPROCEDURAL EXAMINATION: ICD-10-CM

## 2023-05-22 DIAGNOSIS — Z86.19 PERSONAL HISTORY OF OTHER INFECTIOUS AND PARASITIC DISEASES: ICD-10-CM

## 2023-05-22 DIAGNOSIS — Z09 ENCOUNTER FOR FOLLOW-UP EXAMINATION AFTER COMPLETED TREATMENT FOR CONDITIONS OTHER THAN MALIGNANT NEOPLASM: ICD-10-CM

## 2023-05-22 DIAGNOSIS — Z85.3 PERSONAL HISTORY OF MALIGNANT NEOPLASM OF BREAST: ICD-10-CM

## 2023-05-22 DIAGNOSIS — S69.92XA UNSPECIFIED INJURY OF LEFT WRIST, HAND AND FINGER(S), INITIAL ENCOUNTER: ICD-10-CM

## 2023-05-22 DIAGNOSIS — S69.91XA UNSPECIFIED INJURY OF RIGHT WRIST, HAND AND FINGER(S), INITIAL ENCOUNTER: ICD-10-CM

## 2023-05-22 DIAGNOSIS — Z87.09 PERSONAL HISTORY OF OTHER DISEASES OF THE RESPIRATORY SYSTEM: ICD-10-CM

## 2023-05-22 DIAGNOSIS — E66.3 OVERWEIGHT: ICD-10-CM

## 2023-05-22 DIAGNOSIS — Z12.11 ENCOUNTER FOR SCREENING FOR MALIGNANT NEOPLASM OF COLON: ICD-10-CM

## 2023-05-22 DIAGNOSIS — R53.81 OTHER MALAISE: ICD-10-CM

## 2023-05-22 DIAGNOSIS — R06.02 SHORTNESS OF BREATH: ICD-10-CM

## 2023-05-22 DIAGNOSIS — Z12.31 ENCOUNTER FOR SCREENING MAMMOGRAM FOR MALIGNANT NEOPLASM OF BREAST: ICD-10-CM

## 2023-05-22 DIAGNOSIS — Z23 ENCOUNTER FOR IMMUNIZATION: ICD-10-CM

## 2023-05-22 DIAGNOSIS — Z00.00 ENCOUNTER FOR GENERAL ADULT MEDICAL EXAMINATION W/OUT ABNORMAL FINDINGS: ICD-10-CM

## 2023-05-22 DIAGNOSIS — R53.83 OTHER MALAISE: ICD-10-CM

## 2023-05-22 PROCEDURE — 90472 IMMUNIZATION ADMIN EACH ADD: CPT

## 2023-05-22 PROCEDURE — 90471 IMMUNIZATION ADMIN: CPT

## 2023-05-22 PROCEDURE — G0402 INITIAL PREVENTIVE EXAM: CPT

## 2023-05-22 PROCEDURE — 36415 COLL VENOUS BLD VENIPUNCTURE: CPT

## 2023-05-22 PROCEDURE — 90677 PCV20 VACCINE IM: CPT

## 2023-05-22 PROCEDURE — G0403: CPT

## 2023-05-22 PROCEDURE — 99214 OFFICE O/P EST MOD 30 MIN: CPT | Mod: 25

## 2023-05-22 PROCEDURE — G0009: CPT | Mod: 59

## 2023-05-22 PROCEDURE — 90715 TDAP VACCINE 7 YRS/> IM: CPT

## 2023-05-22 RX ORDER — NYSTATIN 100000 U/G
100000 OINTMENT TOPICAL
Qty: 30 | Refills: 0 | Status: DISCONTINUED | COMMUNITY
Start: 2022-05-24 | End: 2023-05-22

## 2023-05-22 RX ORDER — FLUTICASONE PROPIONATE 50 UG/1
50 SPRAY, METERED NASAL DAILY
Qty: 16 | Refills: 0 | Status: DISCONTINUED | COMMUNITY
Start: 2023-05-01 | End: 2023-05-22

## 2023-05-22 RX ORDER — METRONIDAZOLE 500 MG/1
500 TABLET ORAL
Qty: 42 | Refills: 0 | Status: DISCONTINUED | COMMUNITY
Start: 2023-01-27

## 2023-05-22 RX ORDER — TRIAMCINOLONE ACETONIDE 1 MG/G
0.1 CREAM TOPICAL
Qty: 1 | Refills: 0 | Status: DISCONTINUED | COMMUNITY
Start: 2022-02-14 | End: 2023-05-22

## 2023-05-22 RX ORDER — TETRACYCLINE HYDROCHLORIDE 500 MG/1
500 CAPSULE ORAL
Qty: 56 | Refills: 0 | Status: DISCONTINUED | COMMUNITY
Start: 2023-01-27

## 2023-05-22 RX ORDER — BENZONATATE 200 MG/1
200 CAPSULE ORAL
Qty: 20 | Refills: 0 | Status: DISCONTINUED | COMMUNITY
Start: 2022-06-11 | End: 2023-05-22

## 2023-05-22 RX ORDER — NIRMATRELVIR AND RITONAVIR 300-100 MG
20 X 150 MG & KIT ORAL
Qty: 30 | Refills: 0 | Status: DISCONTINUED | COMMUNITY
Start: 2022-06-03 | End: 2023-05-22

## 2023-05-22 RX ORDER — AMOXICILLIN AND CLAVULANATE POTASSIUM 875; 125 MG/1; MG/1
875-125 TABLET, COATED ORAL TWICE DAILY
Qty: 20 | Refills: 0 | Status: DISCONTINUED | COMMUNITY
Start: 2023-05-01 | End: 2023-05-22

## 2023-05-22 RX ORDER — LIDOCAINE AND PRILOCAINE 25; 25 MG/G; MG/G
2.5-2.5 CREAM TOPICAL
Qty: 1 | Refills: 0 | Status: DISCONTINUED | COMMUNITY
Start: 2022-06-08 | End: 2023-05-22

## 2023-05-22 RX ORDER — CLARITHROMYCIN 500 MG/1
500 TABLET, FILM COATED ORAL
Qty: 28 | Refills: 0 | Status: DISCONTINUED | COMMUNITY
Start: 2023-02-02

## 2023-05-22 RX ORDER — PANTOPRAZOLE 40 MG/1
40 TABLET, DELAYED RELEASE ORAL DAILY
Refills: 0 | Status: ACTIVE | COMMUNITY
Start: 2023-04-07

## 2023-05-22 RX ORDER — LEVOFLOXACIN 500 MG/1
500 TABLET, FILM COATED ORAL
Qty: 14 | Refills: 0 | Status: DISCONTINUED | COMMUNITY
Start: 2023-04-07

## 2023-05-22 NOTE — HISTORY OF PRESENT ILLNESS
[de-identified] : 67 year F with PMH breast cancer and HLD presents for annual, initial visit, blood work, and vaccines. Pt denies CP/SOB, fever/chills, n/v/d/c.

## 2023-05-22 NOTE — PLAN
[FreeTextEntry1] : Routine blood work drawn in office and urine collected today. Tdap and Prevnar 20 today. ECG today. Pt advised to sign up for [facility] portal to review labs and communicate any questions or concerns directly. Yearly physical and return as needed for illness, medication refills, and new or existing complaints.

## 2023-05-22 NOTE — HEALTH RISK ASSESSMENT
[Good] : ~his/her~  mood as  good [No] : In the past 12 months have you used drugs other than those required for medical reasons? No [0] : 2) Feeling down, depressed, or hopeless: Not at all (0) [PHQ-2 Negative - No further assessment needed] : PHQ-2 Negative - No further assessment needed [Patient reported mammogram was normal] : Patient reported mammogram was normal [Patient reported colonoscopy was normal] : Patient reported colonoscopy was normal [None] : None [With Family] : lives with family [Employed] : employed [Fully functional (bathing, dressing, toileting, transferring, walking, feeding)] : Fully functional (bathing, dressing, toileting, transferring, walking, feeding) [Fully functional (using the telephone, shopping, preparing meals, housekeeping, doing laundry, using] : Fully functional and needs no help or supervision to perform IADLs (using the telephone, shopping, preparing meals, housekeeping, doing laundry, using transportation, managing medications and managing finances) [Reports normal functional visual acuity (ie: able to read med bottle)] : Reports normal functional visual acuity [Patient/Caregiver not ready to engage] : , patient/caregiver not ready to engage [Never] : Never [Audit-CScore] : 0 [ROX5Qgrlc] : 0 [Reports changes in hearing] : Reports no changes in hearing [Reports changes in vision] : Reports no changes in vision [MammogramComments] : Following with specialist [ColonoscopyComments] : Due this year [AdvancecareDate] : 05/23

## 2023-05-23 LAB
25(OH)D3 SERPL-MCNC: 51.7 NG/ML
ALBUMIN SERPL ELPH-MCNC: 4.7 G/DL
ALP BLD-CCNC: 81 U/L
ALT SERPL-CCNC: 26 U/L
ANION GAP SERPL CALC-SCNC: 18 MMOL/L
APPEARANCE: CLEAR
AST SERPL-CCNC: 22 U/L
BACTERIA: NEGATIVE /HPF
BILIRUB SERPL-MCNC: 0.4 MG/DL
BILIRUBIN URINE: NEGATIVE
BLOOD URINE: ABNORMAL
BUN SERPL-MCNC: 21 MG/DL
CALCIUM SERPL-MCNC: 9.7 MG/DL
CAST: 0 /LPF
CHLORIDE SERPL-SCNC: 99 MMOL/L
CHOLEST SERPL-MCNC: 274 MG/DL
CO2 SERPL-SCNC: 23 MMOL/L
COLOR: YELLOW
CREAT SERPL-MCNC: 0.82 MG/DL
EGFR: 79 ML/MIN/1.73M2
EPITHELIAL CELLS: 1 /HPF
ESTIMATED AVERAGE GLUCOSE: 105 MG/DL
GLUCOSE QUALITATIVE U: NEGATIVE MG/DL
GLUCOSE SERPL-MCNC: 87 MG/DL
HBA1C MFR BLD HPLC: 5.3 %
HDLC SERPL-MCNC: 64 MG/DL
KETONES URINE: NEGATIVE MG/DL
LDLC SERPL CALC-MCNC: 176 MG/DL
LEUKOCYTE ESTERASE URINE: NEGATIVE
MICROSCOPIC-UA: NORMAL
NITRITE URINE: NEGATIVE
NONHDLC SERPL-MCNC: 210 MG/DL
PH URINE: 5.5
POTASSIUM SERPL-SCNC: 5.1 MMOL/L
PROT SERPL-MCNC: 7.5 G/DL
PROTEIN URINE: NEGATIVE MG/DL
RED BLOOD CELLS URINE: 0 /HPF
SODIUM SERPL-SCNC: 140 MMOL/L
SPECIFIC GRAVITY URINE: 1.02
TRIGL SERPL-MCNC: 170 MG/DL
TSH SERPL-ACNC: 0.65 UIU/ML
UROBILINOGEN URINE: 0.2 MG/DL
WHITE BLOOD CELLS URINE: 0 /HPF

## 2023-06-14 ENCOUNTER — RX RENEWAL (OUTPATIENT)
Age: 68
End: 2023-06-14

## 2023-06-21 ENCOUNTER — RX RENEWAL (OUTPATIENT)
Age: 68
End: 2023-06-21

## 2023-07-03 ENCOUNTER — RX RENEWAL (OUTPATIENT)
Age: 68
End: 2023-07-03

## 2023-07-13 ENCOUNTER — RX RENEWAL (OUTPATIENT)
Age: 68
End: 2023-07-13

## 2023-07-18 ENCOUNTER — RX RENEWAL (OUTPATIENT)
Age: 68
End: 2023-07-18

## 2023-08-09 ENCOUNTER — RX RENEWAL (OUTPATIENT)
Age: 68
End: 2023-08-09

## 2023-08-14 ENCOUNTER — RX RENEWAL (OUTPATIENT)
Age: 68
End: 2023-08-14

## 2023-08-22 ENCOUNTER — APPOINTMENT (OUTPATIENT)
Dept: ORTHOPEDIC SURGERY | Facility: CLINIC | Age: 68
End: 2023-08-22
Payer: MEDICARE

## 2023-08-22 VITALS — BODY MASS INDEX: 26.37 KG/M2 | WEIGHT: 170 LBS | HEIGHT: 67.5 IN

## 2023-08-22 PROCEDURE — 99214 OFFICE O/P EST MOD 30 MIN: CPT | Mod: 25

## 2023-08-22 PROCEDURE — 20604 DRAIN/INJ JOINT/BURSA W/US: CPT | Mod: 50

## 2023-08-23 NOTE — PHYSICAL EXAM
[de-identified] : Constitutional: Well-nourished, well-developed, No acute distress\par  Respiratory: Good respiratory effort, no SOB\par  Lymphatic: No regional lymphadenopathy, no lymphedema\par  Psychiatric: Pleasant and normal affect, alert and oriented x3\par  Skin: Clean dry and intact B/L UE\par  Musculoskeletal: normal except where as noted in regional exam\par  \par  Right Hand:\par  APPEARANCE: No swelling, no marked deformities or malalignment of the fingers\par  POSITIVE TENDERNESS: + 1st CMC joint\par  NONTENDER: all other osseous and ligamentous structures. \par  ROM: + Crepitus and mild limitation of the first CMC joint, otherwise full & painless in CMC, MCP, and IP joints. \par  RESISTIVE TESTING: painless resisted testing. \par  SPECIAL TESTS: normal sensation of 1st through 5th digits, normal flex/ext, abd/add, and opposition of thumb, no triggering of fingers\par  Vasc: 2+ radial pulse, normal capillary refill\par  Neuro: AIN, PIN, Ulnar nerve intact to motor\par  Sensation: Intact to light touch throughout\par  \par  Left Hand:\par  APPEARANCE: No swelling, no marked deformities or malalignment of the fingers\par  POSITIVE TENDERNESS: + 1st CMC joint\par  NONTENDER: all other osseous and ligamentous structures. \par  ROM: + Crepitus and mild limitation of the first CMC joint, otherwise full & painless in CMC, MCP, and IP joints. \par  RESISTIVE TESTING: painless resisted testing. \par  SPECIAL TESTS: normal sensation of 1st through 5th digits, normal flex/ext, abd/add, and opposition of thumb, no triggering of fingers\par  Vasc: 2+ radial pulse, normal capillary refill\par  Neuro: AIN, PIN, Ulnar nerve intact to motor\par  Sensation: Intact to light touch throughout\par  \par  \par  \par

## 2023-08-23 NOTE — DISCUSSION/SUMMARY
[de-identified] : Patient was seen today for reevaluation of chronic bilateral hand pain with recent atraumatic exacerbation secondary to severe CMC osteoarthritis. Patient is aware that the permanent resolution option for this would be surgical intervention, she is still hesitant regarding the idea of hand surgery consultation at this time, but is looking for pain relief.  Patient was advised that there is a limit of 3 cortisone injections per joint per 12-month period.  She is advised this is now would be her third cortisone injection within the last 6 months, if she elects to proceed with injections today we could not repeat injections until February 2024.  We discussed various treatment options as well as associated risk/benefits/alternatives and patient elected to proceed with repeat bilateral ultrasound-guided cortisone injections today (see procedure note). Informed the patient that the numbing medicine in today's injection will last for about 4-6 hours. The steroid that was injected will start to work in 1 to 2 days, peak at 1-2 weeks, and may last up to 1-2 months. Follow up as needed.  Patient appreciates and agrees with current plan.   This note was generated using dragon medical dictation software.  A reasonable effort has been made for proofreading its contents, but typos may still remain.  If there are any questions or points of clarification needed please notify my office.

## 2023-08-23 NOTE — PROCEDURE
[de-identified] : Ultrasound Guided Injection \par  Indication: Ensure placement within a small osteoarthritic joint\par  \par  Utlizing the Sonosite II Edge portable ultrasound machine, the Linear 25mm 10-5 MHz transducer, sterile probe cover and sterile ultrasound gel, ultrasound guidance with the probe in the long axis, utilizing an in-plane approach, was used for the following injection:\par  \par  Injection: Cortisone injection to the left 1st CMC joint\par  Indication: Osteoarthritis.\par  \par  A discussion was had with the patient regarding this procedure and all questions were answered. All risks, benefits and alternatives were discussed. These included but were not limited to bleeding, infection, and allergic reaction. A timeout was performed prior to the procedure to ensure proper side and location.  Alcohol was used to clean and sterilize the skin over the lateral CMC joint. A 25-gauge needle was used to inject 0.5cc of 0.5% marcaine and 1cc of 6mg/mL betamethasone into the joint. A sterile bandage was then applied. The patient tolerated the procedure well and there were no complications. \par  ______________________________\par  Ultrasound Guided Injection \par  Indication: Ensure placement within a small osteoarthritic joint\par  \par  Utlizing the Sonosite II Edge portable ultrasound machine, the Linear 25mm 10-5 MHz transducer, sterile probe cover and sterile ultrasound gel, ultrasound guidance with the probe in the long axis, utilizing an in-plane approach, was used for the following injection:\par  \par  Injection: Cortisone injection to the right 1st CMC joint\par  Indication: Osteoarthritis.\par  \par  A discussion was had with the patient regarding this procedure and all questions were answered. All risks, benefits and alternatives were discussed. These included but were not limited to bleeding, infection, and allergic reaction. A timeout was performed prior to the procedure to ensure proper side and location.  Alcohol was used to clean and sterilize the skin over the lateral CMC joint. A 25-gauge needle was used to inject 0.5cc of 0.5% marcaine and 1cc of 6mg/mL betamethasone into the joint. A sterile bandage was then applied. The patient tolerated the procedure well and there were no complications. \par  \par  \par

## 2023-08-30 ENCOUNTER — NON-APPOINTMENT (OUTPATIENT)
Age: 68
End: 2023-08-30

## 2023-08-30 ENCOUNTER — APPOINTMENT (OUTPATIENT)
Dept: CARDIOLOGY | Facility: CLINIC | Age: 68
End: 2023-08-30
Payer: MEDICARE

## 2023-08-30 VITALS
DIASTOLIC BLOOD PRESSURE: 79 MMHG | SYSTOLIC BLOOD PRESSURE: 127 MMHG | HEART RATE: 66 BPM | TEMPERATURE: 98.1 F | BODY MASS INDEX: 27.77 KG/M2 | OXYGEN SATURATION: 99 % | HEIGHT: 67.5 IN | WEIGHT: 179 LBS

## 2023-08-30 DIAGNOSIS — R07.9 CHEST PAIN, UNSPECIFIED: ICD-10-CM

## 2023-08-30 PROCEDURE — 93000 ELECTROCARDIOGRAM COMPLETE: CPT

## 2023-08-30 PROCEDURE — 99204 OFFICE O/P NEW MOD 45 MIN: CPT

## 2023-08-30 NOTE — DISCUSSION/SUMMARY
[FreeTextEntry1] : 68F w HLD, hx of breast CA presents to establish care  1. cp on exertion/GONZALEZ -pt with typical anginal symptoms -several risk factors for cad: hx of RT to chest, and age -ekg with poor r wave progression -start asa 81 -will check TTE to r/o structural heart dz -will check nuclear stress test to r/o ischemia  f/u after initial testing >45 min spent on complete encounter.   [EKG obtained to assist in diagnosis and management of assessed problem(s)] : EKG obtained to assist in diagnosis and management of assessed problem(s)

## 2023-09-05 ENCOUNTER — RX RENEWAL (OUTPATIENT)
Age: 68
End: 2023-09-05

## 2023-09-11 ENCOUNTER — RX RENEWAL (OUTPATIENT)
Age: 68
End: 2023-09-11

## 2023-10-12 ENCOUNTER — APPOINTMENT (OUTPATIENT)
Dept: CT IMAGING | Facility: CLINIC | Age: 68
End: 2023-10-12
Payer: MEDICARE

## 2023-10-12 ENCOUNTER — OUTPATIENT (OUTPATIENT)
Dept: OUTPATIENT SERVICES | Facility: HOSPITAL | Age: 68
LOS: 1 days | End: 2023-10-12
Payer: MEDICARE

## 2023-10-12 DIAGNOSIS — H26.9 UNSPECIFIED CATARACT: Chronic | ICD-10-CM

## 2023-10-12 DIAGNOSIS — Z98.890 OTHER SPECIFIED POSTPROCEDURAL STATES: Chronic | ICD-10-CM

## 2023-10-12 DIAGNOSIS — R07.9 CHEST PAIN, UNSPECIFIED: ICD-10-CM

## 2023-10-12 DIAGNOSIS — Z98.89 OTHER SPECIFIED POSTPROCEDURAL STATES: Chronic | ICD-10-CM

## 2023-10-12 DIAGNOSIS — R06.09 OTHER FORMS OF DYSPNEA: ICD-10-CM

## 2023-10-12 PROCEDURE — 75574 CT ANGIO HRT W/3D IMAGE: CPT | Mod: 26

## 2023-10-12 PROCEDURE — 75574 CT ANGIO HRT W/3D IMAGE: CPT

## 2023-10-13 ENCOUNTER — RX RENEWAL (OUTPATIENT)
Age: 68
End: 2023-10-13

## 2023-10-13 ENCOUNTER — APPOINTMENT (OUTPATIENT)
Dept: CARDIOLOGY | Facility: CLINIC | Age: 68
End: 2023-10-13
Payer: MEDICARE

## 2023-10-13 PROCEDURE — 93306 TTE W/DOPPLER COMPLETE: CPT

## 2023-10-14 ENCOUNTER — TRANSCRIPTION ENCOUNTER (OUTPATIENT)
Age: 68
End: 2023-10-14

## 2023-10-17 ENCOUNTER — RX RENEWAL (OUTPATIENT)
Age: 68
End: 2023-10-17

## 2023-10-24 ENCOUNTER — RX RENEWAL (OUTPATIENT)
Age: 68
End: 2023-10-24

## 2023-10-24 ENCOUNTER — APPOINTMENT (OUTPATIENT)
Dept: CARDIOLOGY | Facility: CLINIC | Age: 68
End: 2023-10-24

## 2023-10-31 ENCOUNTER — APPOINTMENT (OUTPATIENT)
Dept: PULMONOLOGY | Facility: CLINIC | Age: 68
End: 2023-10-31
Payer: MEDICARE

## 2023-10-31 VITALS
SYSTOLIC BLOOD PRESSURE: 126 MMHG | WEIGHT: 182 LBS | OXYGEN SATURATION: 99 % | HEIGHT: 67.5 IN | BODY MASS INDEX: 28.23 KG/M2 | DIASTOLIC BLOOD PRESSURE: 79 MMHG | HEART RATE: 76 BPM

## 2023-10-31 DIAGNOSIS — R91.1 SOLITARY PULMONARY NODULE: ICD-10-CM

## 2023-10-31 DIAGNOSIS — R06.09 OTHER FORMS OF DYSPNEA: ICD-10-CM

## 2023-10-31 PROCEDURE — 99205 OFFICE O/P NEW HI 60 MIN: CPT

## 2023-11-13 ENCOUNTER — RX RENEWAL (OUTPATIENT)
Age: 68
End: 2023-11-13

## 2023-12-04 ENCOUNTER — RX RENEWAL (OUTPATIENT)
Age: 68
End: 2023-12-04

## 2023-12-05 ENCOUNTER — APPOINTMENT (OUTPATIENT)
Dept: PULMONOLOGY | Facility: CLINIC | Age: 68
End: 2023-12-05
Payer: MEDICARE

## 2023-12-05 PROCEDURE — 94010 BREATHING CAPACITY TEST: CPT

## 2023-12-05 PROCEDURE — 94727 GAS DIL/WSHOT DETER LNG VOL: CPT

## 2023-12-05 PROCEDURE — 94729 DIFFUSING CAPACITY: CPT

## 2023-12-13 ENCOUNTER — APPOINTMENT (OUTPATIENT)
Dept: ULTRASOUND IMAGING | Facility: CLINIC | Age: 68
End: 2023-12-13
Payer: MEDICARE

## 2023-12-13 ENCOUNTER — APPOINTMENT (OUTPATIENT)
Dept: MAMMOGRAPHY | Facility: CLINIC | Age: 68
End: 2023-12-13
Payer: MEDICARE

## 2023-12-13 ENCOUNTER — OUTPATIENT (OUTPATIENT)
Dept: OUTPATIENT SERVICES | Facility: HOSPITAL | Age: 68
LOS: 1 days | End: 2023-12-13
Payer: MEDICARE

## 2023-12-13 DIAGNOSIS — Z98.890 OTHER SPECIFIED POSTPROCEDURAL STATES: Chronic | ICD-10-CM

## 2023-12-13 DIAGNOSIS — H26.9 UNSPECIFIED CATARACT: Chronic | ICD-10-CM

## 2023-12-13 DIAGNOSIS — Z98.89 OTHER SPECIFIED POSTPROCEDURAL STATES: Chronic | ICD-10-CM

## 2023-12-13 DIAGNOSIS — Z00.8 ENCOUNTER FOR OTHER GENERAL EXAMINATION: ICD-10-CM

## 2023-12-13 PROCEDURE — 77063 BREAST TOMOSYNTHESIS BI: CPT

## 2023-12-13 PROCEDURE — 77063 BREAST TOMOSYNTHESIS BI: CPT | Mod: 26

## 2023-12-13 PROCEDURE — 77067 SCR MAMMO BI INCL CAD: CPT

## 2023-12-13 PROCEDURE — 77067 SCR MAMMO BI INCL CAD: CPT | Mod: 26

## 2023-12-20 ENCOUNTER — RX RENEWAL (OUTPATIENT)
Age: 68
End: 2023-12-20

## 2024-01-02 ENCOUNTER — RX RENEWAL (OUTPATIENT)
Age: 69
End: 2024-01-02

## 2024-02-19 ENCOUNTER — OUTPATIENT (OUTPATIENT)
Dept: OUTPATIENT SERVICES | Facility: HOSPITAL | Age: 69
LOS: 1 days | End: 2024-02-19
Payer: MEDICARE

## 2024-02-19 ENCOUNTER — APPOINTMENT (OUTPATIENT)
Dept: CT IMAGING | Facility: CLINIC | Age: 69
End: 2024-02-19
Payer: MEDICARE

## 2024-02-19 DIAGNOSIS — H26.9 UNSPECIFIED CATARACT: Chronic | ICD-10-CM

## 2024-02-19 DIAGNOSIS — Z98.890 OTHER SPECIFIED POSTPROCEDURAL STATES: Chronic | ICD-10-CM

## 2024-02-19 DIAGNOSIS — Z98.89 OTHER SPECIFIED POSTPROCEDURAL STATES: Chronic | ICD-10-CM

## 2024-02-19 DIAGNOSIS — R91.1 SOLITARY PULMONARY NODULE: ICD-10-CM

## 2024-02-19 PROCEDURE — 71250 CT THORAX DX C-: CPT

## 2024-02-19 PROCEDURE — 71250 CT THORAX DX C-: CPT | Mod: 26

## 2024-03-14 ENCOUNTER — TRANSCRIPTION ENCOUNTER (OUTPATIENT)
Age: 69
End: 2024-03-14

## 2024-03-28 ENCOUNTER — RX RENEWAL (OUTPATIENT)
Age: 69
End: 2024-03-28

## 2024-03-29 ENCOUNTER — RX RENEWAL (OUTPATIENT)
Age: 69
End: 2024-03-29

## 2024-04-08 ENCOUNTER — APPOINTMENT (OUTPATIENT)
Dept: INTERNAL MEDICINE | Facility: CLINIC | Age: 69
End: 2024-04-08
Payer: MEDICARE

## 2024-04-08 VITALS
BODY MASS INDEX: 28.7 KG/M2 | DIASTOLIC BLOOD PRESSURE: 82 MMHG | SYSTOLIC BLOOD PRESSURE: 139 MMHG | OXYGEN SATURATION: 98 % | WEIGHT: 185 LBS | HEART RATE: 89 BPM | HEIGHT: 67.5 IN | TEMPERATURE: 98.3 F

## 2024-04-08 DIAGNOSIS — E55.9 VITAMIN D DEFICIENCY, UNSPECIFIED: ICD-10-CM

## 2024-04-08 DIAGNOSIS — M77.01 MEDIAL EPICONDYLITIS, RIGHT ELBOW: ICD-10-CM

## 2024-04-08 PROCEDURE — G2211 COMPLEX E/M VISIT ADD ON: CPT

## 2024-04-08 PROCEDURE — 99214 OFFICE O/P EST MOD 30 MIN: CPT

## 2024-04-08 RX ORDER — ERGOCALCIFEROL 1.25 MG/1
1.25 MG CAPSULE, LIQUID FILLED ORAL
Qty: 13 | Refills: 1 | Status: ACTIVE | COMMUNITY
Start: 2020-09-23 | End: 1900-01-01

## 2024-04-08 RX ORDER — DICLOFENAC SODIUM 1% 10 MG/G
1 GEL TOPICAL
Qty: 100 | Refills: 0 | Status: DISCONTINUED | COMMUNITY
Start: 2022-08-30 | End: 2024-04-08

## 2024-04-08 NOTE — PLAN
[FreeTextEntry1] : Plan as above. Rx sent. Information on stretches provided. Refer to orthopedics. Vitamin D renewed. Pt advised to sign up for [facility] portal to review labs and communicate any questions or concerns directly. Yearly physical and return as needed for illness, medication refills, and new or existing complaints. CPE after May.

## 2024-04-08 NOTE — HISTORY OF PRESENT ILLNESS
[FreeTextEntry8] : 68 year old F with PMH breast cancer and HLD presents for right elbow pain. Pt denies CP/SOB, fever/chills, n/v/d/c.

## 2024-04-08 NOTE — PHYSICAL EXAM
[No Acute Distress] : no acute distress [Well-Appearing] : well-appearing [No Respiratory Distress] : no respiratory distress  [No Accessory Muscle Use] : no accessory muscle use [Coordination Grossly Intact] : coordination grossly intact [No Focal Deficits] : no focal deficits [Normal Gait] : normal gait [Normal Affect] : the affect was normal [Normal Insight/Judgement] : insight and judgment were intact [de-identified] : right medial epicondyle tenderness, pain on restricted pronation

## 2024-04-11 DIAGNOSIS — L03.019 CELLULITIS OF UNSPECIFIED FINGER: ICD-10-CM

## 2024-04-11 RX ORDER — SULFAMETHOXAZOLE AND TRIMETHOPRIM 800; 160 MG/1; MG/1
800-160 TABLET ORAL TWICE DAILY
Qty: 20 | Refills: 0 | Status: COMPLETED | COMMUNITY
Start: 2024-04-11 | End: 2024-04-21

## 2024-05-21 ENCOUNTER — NON-APPOINTMENT (OUTPATIENT)
Age: 69
End: 2024-05-21

## 2024-05-21 ENCOUNTER — APPOINTMENT (OUTPATIENT)
Dept: CARDIOLOGY | Facility: CLINIC | Age: 69
End: 2024-05-21
Payer: MEDICARE

## 2024-05-21 VITALS
SYSTOLIC BLOOD PRESSURE: 131 MMHG | WEIGHT: 184 LBS | HEART RATE: 81 BPM | OXYGEN SATURATION: 97 % | HEIGHT: 67.5 IN | BODY MASS INDEX: 28.54 KG/M2 | TEMPERATURE: 97.4 F | DIASTOLIC BLOOD PRESSURE: 78 MMHG

## 2024-05-21 DIAGNOSIS — I25.10 ATHEROSCLEROTIC HEART DISEASE OF NATIVE CORONARY ARTERY W/OUT ANGINA PECTORIS: ICD-10-CM

## 2024-05-21 PROCEDURE — 99215 OFFICE O/P EST HI 40 MIN: CPT

## 2024-05-21 PROCEDURE — 93000 ELECTROCARDIOGRAM COMPLETE: CPT

## 2024-05-21 PROCEDURE — G2211 COMPLEX E/M VISIT ADD ON: CPT

## 2024-05-21 RX ORDER — ATORVASTATIN CALCIUM 20 MG/1
20 TABLET, FILM COATED ORAL
Qty: 90 | Refills: 1 | Status: ACTIVE | COMMUNITY
Start: 2023-10-16 | End: 1900-01-01

## 2024-05-21 NOTE — DISCUSSION/SUMMARY
[FreeTextEntry1] : 68F w HLD, hx of breast CA s/p RT, non obstructive CAD presents for f/u  1. cp on exertion/GONZALEZ -resolved -coronary cta reviewed, calcium score 138 -advised to resume asa, pt to consider it -cont lipitor  -bp controlled, pt not interested in taking any other meds  f/u 6 months unless requires sooner 45 min spent on complete encounter.   [EKG obtained to assist in diagnosis and management of assessed problem(s)] : EKG obtained to assist in diagnosis and management of assessed problem(s)

## 2024-05-21 NOTE — HISTORY OF PRESENT ILLNESS
[FreeTextEntry1] : 68F w HLD, hx of breast CA presents for f/u PMD: Dr Vasquez  Previously, pt last seen 8/23 for an initial eval with chest pain on exertion.  CTA coronary showed a calcium score of 138 and no significant coronary stenosis. TTE grossly unremarkable.  pt now presents for follow up. today,  pt feeling well today. no cp sob. trying to lose weight, walking 3 miles daily. eating healthier.  no palpitations dizziness syncope, edema orthopnea   stopped asa 81, didnt feel like taking anymore    Exercise: walking daily Diet: none  Prev cardiac history: none Previous cardiac testing: none Recent labs:  EKG: SR  Med hx: breast CA s/p sx, and RT OBGYN hx: no children. no Hx of miscarriage. Currently post menopause. Sx hx: hernia, breast sx x3, d and c, lipoma excision   Family hx: no known cardiac hx Social hx: lives in [place], with significant other, feels safe at home. , part time. no tob/etoh/drugs Meds: lipitor  Allergies: nkda

## 2024-05-29 NOTE — ASU PREOP CHECKLIST - LOOSE TEETH
Patient was in the ER yesterday.  He complains of Black Tar like stool and abdominal pain.  Patient states test in ER were negative but he is still  not feeling well.  Patient is requesting to speak with an RN.  Please call   no

## 2024-06-24 ENCOUNTER — APPOINTMENT (OUTPATIENT)
Dept: ORTHOPEDIC SURGERY | Facility: CLINIC | Age: 69
End: 2024-06-24
Payer: MEDICARE

## 2024-06-24 VITALS — WEIGHT: 180 LBS | HEIGHT: 67 IN | BODY MASS INDEX: 28.25 KG/M2

## 2024-06-24 DIAGNOSIS — M18.0 BILATERAL PRIMARY OSTEOARTHRITIS OF FIRST CARPOMETACARPAL JOINTS: ICD-10-CM

## 2024-06-24 PROCEDURE — 99214 OFFICE O/P EST MOD 30 MIN: CPT | Mod: 25

## 2024-06-24 PROCEDURE — 20604 DRAIN/INJ JOINT/BURSA W/US: CPT | Mod: 50

## 2024-06-24 RX ORDER — DICLOFENAC SODIUM 50 MG/1
50 TABLET, DELAYED RELEASE ORAL
Qty: 60 | Refills: 0 | Status: COMPLETED | COMMUNITY
Start: 2023-05-22 | End: 2024-06-24

## 2024-06-24 RX ORDER — DICLOFENAC SODIUM 50 MG/1
50 TABLET, DELAYED RELEASE ORAL
Qty: 60 | Refills: 0 | Status: ACTIVE | COMMUNITY
Start: 2024-06-24 | End: 1900-01-01

## 2024-06-24 RX ORDER — DICLOFENAC SODIUM 1% 10 MG/G
1 GEL TOPICAL
Qty: 1 | Refills: 0 | Status: ACTIVE | COMMUNITY
Start: 2024-06-24 | End: 1900-01-01

## 2024-06-26 PROBLEM — M18.0 PRIMARY OSTEOARTHRITIS OF BOTH FIRST CARPOMETACARPAL JOINTS: Status: ACTIVE | Noted: 2020-11-09

## 2024-07-15 ENCOUNTER — RX RENEWAL (OUTPATIENT)
Age: 69
End: 2024-07-15

## 2024-07-31 ENCOUNTER — RX RENEWAL (OUTPATIENT)
Age: 69
End: 2024-07-31

## 2024-10-28 ENCOUNTER — RX RENEWAL (OUTPATIENT)
Age: 69
End: 2024-10-28

## 2024-11-18 ENCOUNTER — NON-APPOINTMENT (OUTPATIENT)
Age: 69
End: 2024-11-18

## 2024-11-18 ENCOUNTER — APPOINTMENT (OUTPATIENT)
Dept: CARDIOLOGY | Facility: CLINIC | Age: 69
End: 2024-11-18
Payer: MEDICARE

## 2024-11-18 VITALS
HEART RATE: 78 BPM | BODY MASS INDEX: 29.66 KG/M2 | WEIGHT: 189 LBS | TEMPERATURE: 97.8 F | DIASTOLIC BLOOD PRESSURE: 81 MMHG | SYSTOLIC BLOOD PRESSURE: 127 MMHG | OXYGEN SATURATION: 98 % | HEIGHT: 67 IN

## 2024-11-18 DIAGNOSIS — I25.10 ATHEROSCLEROTIC HEART DISEASE OF NATIVE CORONARY ARTERY W/OUT ANGINA PECTORIS: ICD-10-CM

## 2024-11-18 DIAGNOSIS — E78.00 PURE HYPERCHOLESTEROLEMIA, UNSPECIFIED: ICD-10-CM

## 2024-11-18 PROCEDURE — 93000 ELECTROCARDIOGRAM COMPLETE: CPT

## 2024-11-18 PROCEDURE — G2211 COMPLEX E/M VISIT ADD ON: CPT

## 2024-11-18 PROCEDURE — 99215 OFFICE O/P EST HI 40 MIN: CPT

## 2024-12-10 ENCOUNTER — APPOINTMENT (OUTPATIENT)
Dept: INTERNAL MEDICINE | Facility: CLINIC | Age: 69
End: 2024-12-10
Payer: MEDICARE

## 2024-12-10 VITALS
OXYGEN SATURATION: 97 % | SYSTOLIC BLOOD PRESSURE: 138 MMHG | BODY MASS INDEX: 29.16 KG/M2 | HEIGHT: 67.32 IN | WEIGHT: 188 LBS | TEMPERATURE: 98 F | DIASTOLIC BLOOD PRESSURE: 77 MMHG | HEART RATE: 88 BPM

## 2024-12-10 DIAGNOSIS — L98.9 DISORDER OF THE SKIN AND SUBCUTANEOUS TISSUE, UNSPECIFIED: ICD-10-CM

## 2024-12-10 DIAGNOSIS — Z78.9 OTHER SPECIFIED HEALTH STATUS: ICD-10-CM

## 2024-12-10 DIAGNOSIS — Z87.39 PERSONAL HISTORY OF OTHER DISEASES OF THE MUSCULOSKELETAL SYSTEM AND CONNECTIVE TISSUE: ICD-10-CM

## 2024-12-10 DIAGNOSIS — Z12.31 ENCOUNTER FOR SCREENING MAMMOGRAM FOR MALIGNANT NEOPLASM OF BREAST: ICD-10-CM

## 2024-12-10 DIAGNOSIS — M79.641 PAIN IN RIGHT HAND: ICD-10-CM

## 2024-12-10 DIAGNOSIS — E55.9 VITAMIN D DEFICIENCY, UNSPECIFIED: ICD-10-CM

## 2024-12-10 DIAGNOSIS — H01.9 UNSPECIFIED INFLAMMATION OF EYELID: ICD-10-CM

## 2024-12-10 DIAGNOSIS — R10.9 UNSPECIFIED ABDOMINAL PAIN: ICD-10-CM

## 2024-12-10 DIAGNOSIS — Z23 ENCOUNTER FOR IMMUNIZATION: ICD-10-CM

## 2024-12-10 DIAGNOSIS — R21 RASH AND OTHER NONSPECIFIC SKIN ERUPTION: ICD-10-CM

## 2024-12-10 DIAGNOSIS — R91.1 SOLITARY PULMONARY NODULE: ICD-10-CM

## 2024-12-10 DIAGNOSIS — Z12.11 ENCOUNTER FOR SCREENING FOR MALIGNANT NEOPLASM OF COLON: ICD-10-CM

## 2024-12-10 DIAGNOSIS — I25.10 ATHEROSCLEROTIC HEART DISEASE OF NATIVE CORONARY ARTERY W/OUT ANGINA PECTORIS: ICD-10-CM

## 2024-12-10 DIAGNOSIS — E66.3 OVERWEIGHT: ICD-10-CM

## 2024-12-10 DIAGNOSIS — Z00.00 ENCOUNTER FOR GENERAL ADULT MEDICAL EXAMINATION W/OUT ABNORMAL FINDINGS: ICD-10-CM

## 2024-12-10 PROCEDURE — G0439: CPT

## 2024-12-10 PROCEDURE — 36415 COLL VENOUS BLD VENIPUNCTURE: CPT

## 2024-12-10 RX ORDER — ASPIRIN 81 MG/1
81 TABLET, COATED ORAL
Refills: 0 | Status: ACTIVE | COMMUNITY

## 2024-12-11 ENCOUNTER — TRANSCRIPTION ENCOUNTER (OUTPATIENT)
Age: 69
End: 2024-12-11

## 2024-12-11 LAB
25(OH)D3 SERPL-MCNC: 43 NG/ML
ALBUMIN SERPL ELPH-MCNC: 4.4 G/DL
ALP BLD-CCNC: 101 U/L
ALT SERPL-CCNC: 13 U/L
ANION GAP SERPL CALC-SCNC: 17 MMOL/L
APPEARANCE: CLEAR
AST SERPL-CCNC: 17 U/L
BACTERIA: NEGATIVE /HPF
BASOPHILS # BLD AUTO: 0.03 K/UL
BASOPHILS NFR BLD AUTO: 0.3 %
BILIRUB SERPL-MCNC: 0.3 MG/DL
BILIRUBIN URINE: NEGATIVE
BLOOD URINE: ABNORMAL
BUN SERPL-MCNC: 19 MG/DL
CALCIUM SERPL-MCNC: 9.8 MG/DL
CAST: 0 /LPF
CHLORIDE SERPL-SCNC: 103 MMOL/L
CHOLEST SERPL-MCNC: 201 MG/DL
CO2 SERPL-SCNC: 20 MMOL/L
COLOR: YELLOW
CREAT SERPL-MCNC: 0.84 MG/DL
EGFR: 76 ML/MIN/1.73M2
EOSINOPHIL # BLD AUTO: 0.19 K/UL
EOSINOPHIL NFR BLD AUTO: 2.1 %
EPITHELIAL CELLS: 4 /HPF
ESTIMATED AVERAGE GLUCOSE: 108 MG/DL
GLUCOSE QUALITATIVE U: NEGATIVE MG/DL
GLUCOSE SERPL-MCNC: 97 MG/DL
HBA1C MFR BLD HPLC: 5.4 %
HCT VFR BLD CALC: 44.4 %
HDLC SERPL-MCNC: 54 MG/DL
HGB BLD-MCNC: 14.2 G/DL
IMM GRANULOCYTES NFR BLD AUTO: 1.7 %
KETONES URINE: NEGATIVE MG/DL
LDLC SERPL CALC-MCNC: 108 MG/DL
LEUKOCYTE ESTERASE URINE: NEGATIVE
LYMPHOCYTES # BLD AUTO: 3.04 K/UL
LYMPHOCYTES NFR BLD AUTO: 34.1 %
MAN DIFF?: NORMAL
MCHC RBC-ENTMCNC: 26.8 PG
MCHC RBC-ENTMCNC: 32 G/DL
MCV RBC AUTO: 83.8 FL
MICROSCOPIC-UA: NORMAL
MONOCYTES # BLD AUTO: 0.62 K/UL
MONOCYTES NFR BLD AUTO: 7 %
NEUTROPHILS # BLD AUTO: 4.89 K/UL
NEUTROPHILS NFR BLD AUTO: 54.8 %
NITRITE URINE: NEGATIVE
NONHDLC SERPL-MCNC: 147 MG/DL
PH URINE: 6
PLATELET # BLD AUTO: 281 K/UL
POTASSIUM SERPL-SCNC: 4.7 MMOL/L
PROT SERPL-MCNC: 7.3 G/DL
PROTEIN URINE: NEGATIVE MG/DL
RBC # BLD: 5.3 M/UL
RBC # FLD: 13.3 %
RED BLOOD CELLS URINE: 2 /HPF
SODIUM SERPL-SCNC: 140 MMOL/L
SPECIFIC GRAVITY URINE: 1.02
TRIGL SERPL-MCNC: 229 MG/DL
TSH SERPL-ACNC: 1.03 UIU/ML
UROBILINOGEN URINE: 0.2 MG/DL
WBC # FLD AUTO: 8.92 K/UL
WHITE BLOOD CELLS URINE: 0 /HPF

## 2025-01-27 ENCOUNTER — RX RENEWAL (OUTPATIENT)
Age: 70
End: 2025-01-27

## 2025-02-18 ENCOUNTER — APPOINTMENT (OUTPATIENT)
Dept: ULTRASOUND IMAGING | Facility: CLINIC | Age: 70
End: 2025-02-18
Payer: MEDICARE

## 2025-02-18 ENCOUNTER — OUTPATIENT (OUTPATIENT)
Dept: OUTPATIENT SERVICES | Facility: HOSPITAL | Age: 70
LOS: 1 days | End: 2025-02-18
Payer: MEDICARE

## 2025-02-18 ENCOUNTER — APPOINTMENT (OUTPATIENT)
Dept: MAMMOGRAPHY | Facility: CLINIC | Age: 70
End: 2025-02-18
Payer: MEDICARE

## 2025-02-18 DIAGNOSIS — Z98.890 OTHER SPECIFIED POSTPROCEDURAL STATES: Chronic | ICD-10-CM

## 2025-02-18 DIAGNOSIS — Z00.8 ENCOUNTER FOR OTHER GENERAL EXAMINATION: ICD-10-CM

## 2025-02-18 DIAGNOSIS — Z98.89 OTHER SPECIFIED POSTPROCEDURAL STATES: Chronic | ICD-10-CM

## 2025-02-18 DIAGNOSIS — H26.9 UNSPECIFIED CATARACT: Chronic | ICD-10-CM

## 2025-02-18 PROCEDURE — 77067 SCR MAMMO BI INCL CAD: CPT | Mod: 26

## 2025-02-18 PROCEDURE — 76641 ULTRASOUND BREAST COMPLETE: CPT | Mod: 26,50

## 2025-02-18 PROCEDURE — 76641 ULTRASOUND BREAST COMPLETE: CPT

## 2025-02-18 PROCEDURE — 77063 BREAST TOMOSYNTHESIS BI: CPT | Mod: 26

## 2025-02-18 PROCEDURE — 77067 SCR MAMMO BI INCL CAD: CPT

## 2025-02-18 PROCEDURE — 77063 BREAST TOMOSYNTHESIS BI: CPT

## 2025-02-24 ENCOUNTER — APPOINTMENT (OUTPATIENT)
Dept: INTERNAL MEDICINE | Facility: CLINIC | Age: 70
End: 2025-02-24
Payer: MEDICARE

## 2025-02-24 VITALS
BODY MASS INDEX: 28.7 KG/M2 | SYSTOLIC BLOOD PRESSURE: 108 MMHG | RESPIRATION RATE: 16 BRPM | OXYGEN SATURATION: 97 % | HEIGHT: 67.5 IN | WEIGHT: 185 LBS | DIASTOLIC BLOOD PRESSURE: 69 MMHG | HEART RATE: 83 BPM | TEMPERATURE: 97.8 F

## 2025-02-24 DIAGNOSIS — R05.9 COUGH, UNSPECIFIED: ICD-10-CM

## 2025-02-24 DIAGNOSIS — R09.81 NASAL CONGESTION: ICD-10-CM

## 2025-02-24 LAB
FLUAV SPEC QL CULT: NORMAL
FLUBV AG SPEC QL IA: NORMAL

## 2025-02-24 PROCEDURE — 87804 INFLUENZA ASSAY W/OPTIC: CPT | Mod: QW

## 2025-02-24 PROCEDURE — 99214 OFFICE O/P EST MOD 30 MIN: CPT

## 2025-02-24 RX ORDER — DEXTROMETHORPHAN HBR, GUAIFENESIN 10; 200 MG/1; MG/1
10-200 CAPSULE, LIQUID FILLED ORAL
Qty: 42 | Refills: 0 | Status: ACTIVE | COMMUNITY
Start: 2025-02-24 | End: 1900-01-01

## 2025-02-25 LAB
INFLUENZA A RESULT: NOT DETECTED
INFLUENZA B RESULT: NOT DETECTED
RESP SYN VIRUS RESULT: NOT DETECTED
SARS-COV-2 RESULT: NOT DETECTED

## 2025-03-03 ENCOUNTER — RX RENEWAL (OUTPATIENT)
Age: 70
End: 2025-03-03

## 2025-03-03 DIAGNOSIS — J98.59 OTHER DISEASES OF MEDIASTINUM, NOT ELSEWHERE CLASSIFIED: ICD-10-CM

## 2025-03-14 ENCOUNTER — APPOINTMENT (OUTPATIENT)
Dept: CT IMAGING | Facility: CLINIC | Age: 70
End: 2025-03-14

## 2025-03-14 ENCOUNTER — OUTPATIENT (OUTPATIENT)
Dept: OUTPATIENT SERVICES | Facility: HOSPITAL | Age: 70
LOS: 1 days | End: 2025-03-14
Payer: MEDICARE

## 2025-03-14 DIAGNOSIS — Z98.890 OTHER SPECIFIED POSTPROCEDURAL STATES: Chronic | ICD-10-CM

## 2025-03-14 DIAGNOSIS — Z98.89 OTHER SPECIFIED POSTPROCEDURAL STATES: Chronic | ICD-10-CM

## 2025-03-14 DIAGNOSIS — H26.9 UNSPECIFIED CATARACT: Chronic | ICD-10-CM

## 2025-03-14 DIAGNOSIS — Z00.8 ENCOUNTER FOR OTHER GENERAL EXAMINATION: ICD-10-CM

## 2025-03-14 PROCEDURE — 71250 CT THORAX DX C-: CPT | Mod: 26

## 2025-03-14 PROCEDURE — 71250 CT THORAX DX C-: CPT

## 2025-03-20 ENCOUNTER — TRANSCRIPTION ENCOUNTER (OUTPATIENT)
Age: 70
End: 2025-03-20

## 2025-03-26 ENCOUNTER — APPOINTMENT (OUTPATIENT)
Dept: ORTHOPEDIC SURGERY | Facility: CLINIC | Age: 70
End: 2025-03-26
Payer: MEDICARE

## 2025-03-26 VITALS — HEIGHT: 67.5 IN | BODY MASS INDEX: 28.7 KG/M2 | WEIGHT: 185 LBS

## 2025-03-26 DIAGNOSIS — M75.42 IMPINGEMENT SYNDROME OF LEFT SHOULDER: ICD-10-CM

## 2025-03-26 DIAGNOSIS — M51.362: ICD-10-CM

## 2025-03-26 DIAGNOSIS — M47.22 OTHER SPONDYLOSIS WITH RADICULOPATHY, CERVICAL REGION: ICD-10-CM

## 2025-03-26 PROCEDURE — 72170 X-RAY EXAM OF PELVIS: CPT

## 2025-03-26 PROCEDURE — 72040 X-RAY EXAM NECK SPINE 2-3 VW: CPT

## 2025-03-26 PROCEDURE — 72100 X-RAY EXAM L-S SPINE 2/3 VWS: CPT

## 2025-03-26 PROCEDURE — 73030 X-RAY EXAM OF SHOULDER: CPT | Mod: LT

## 2025-03-26 PROCEDURE — 99204 OFFICE O/P NEW MOD 45 MIN: CPT

## 2025-03-26 RX ORDER — METHYLPREDNISOLONE 4 MG/1
4 TABLET ORAL
Qty: 1 | Refills: 0 | Status: ACTIVE | COMMUNITY
Start: 2025-03-26 | End: 1900-01-01

## 2025-03-26 RX ORDER — METHOCARBAMOL 750 MG/1
750 TABLET, FILM COATED ORAL
Qty: 60 | Refills: 0 | Status: ACTIVE | COMMUNITY
Start: 2025-03-26 | End: 1900-01-01

## 2025-03-26 RX ORDER — MELOXICAM 15 MG/1
15 TABLET ORAL
Qty: 30 | Refills: 1 | Status: ACTIVE | COMMUNITY
Start: 2025-03-26 | End: 1900-01-01

## 2025-04-16 ENCOUNTER — APPOINTMENT (OUTPATIENT)
Dept: PAIN MANAGEMENT | Facility: CLINIC | Age: 70
End: 2025-04-16

## 2025-05-20 ENCOUNTER — NON-APPOINTMENT (OUTPATIENT)
Age: 70
End: 2025-05-20

## 2025-05-20 ENCOUNTER — APPOINTMENT (OUTPATIENT)
Dept: ORTHOPEDIC SURGERY | Facility: CLINIC | Age: 70
End: 2025-05-20
Payer: MEDICARE

## 2025-05-20 VITALS — HEIGHT: 67.5 IN | BODY MASS INDEX: 28.7 KG/M2 | WEIGHT: 185 LBS

## 2025-05-20 DIAGNOSIS — M18.0 BILATERAL PRIMARY OSTEOARTHRITIS OF FIRST CARPOMETACARPAL JOINTS: ICD-10-CM

## 2025-05-20 PROCEDURE — 20604 DRAIN/INJ JOINT/BURSA W/US: CPT | Mod: 50

## 2025-05-20 PROCEDURE — 99214 OFFICE O/P EST MOD 30 MIN: CPT | Mod: 25

## 2025-05-27 ENCOUNTER — RX RENEWAL (OUTPATIENT)
Age: 70
End: 2025-05-27

## 2025-07-02 ENCOUNTER — RX RENEWAL (OUTPATIENT)
Age: 70
End: 2025-07-02

## 2025-07-21 NOTE — H&P PST ADULT - MARITAL STATUS
Patient ID:  Mae Brown  1954  71 year old  [id number]    PCP: Juli Farrell MD    Admit date: 7/18/2025    Discharge date and time: 7/21/2025    Discharge Physician: Adam Davidson DO      Primary Discharge Diagnoses and Hospital Course:   Mae Brown is a 71 year old patient with chronic medical conditions including HTN, restless leg syndrome, anxiety-depression, history of alcohol use disorder but alcohol free since March who presents to [facility] on 7/18/2025 for seizures. Patient was noted to have a 2 minute tonic clonic seizure at a restaurant, then a 5 minute seizure was noted by EMS. Patient got 12mg versed en route to ED where she was noted to be post ictal. CT head was negative. Neurology was consulted and patient was loaded with keppra with ICU admission for cEEG. Continuous EEG did show left frontotemporal sharp waves but no ictal activity. Neurology recommending life-long anticonvulsant therapy as patient feels she may have a had a seizure in April that was not worked up. There was another possible seizure in 2018 where she was admitted to [facility] and treated for possible stroke with thrombolytic therapy. MRI brain was negative for stroke at the time. Patient downgraded to medical floor overnight and MRI brain is pending. Patient is doing ok this morning. Seizure free since admission. No confusion. MRI brain was negative. Urine culture did grow E coli and patient did state she was having some dysuria. Oral omnicef started on DC. Wound culture from open sore left thigh showed few staph aureus and staph coagulase negative. However, there was no signs of infected on exam as the site was not red and there was no drainage. Patient stable for discharge home with outpatient PT which patient and spouse are agreeable to.      acute lactic acidosis  Other (please specify) _____________________        Signs/Symptoms:  - Patient presented with seizures, postictal confusion, and altered mental status  -  Initial lactate level was 13.3 mmol/L on 7/18, decreased to 3.6 mmol/L after intervention     Treatment/Evaluation:  - Received IV fluids (1L NS) in the ED  - Lactate levels were trended, indicating clinical concern and monitoring  - No other clear source of elevated lactate documented     Risk Factors:  - Status epilepticus, which can lead to anaerobic metabolism and elevated lactate  - Hypokalemia and possible underlying infection (UTI with E. coli)  - History of alcohol use disorder (in remission), which may contribute to metabolic derangements         Secondary Diagnoses:   Past Medical History:   Diagnosis Date    Acquired absence of both cervix and uterus 06/18/2005    Alcohol abuse, in remission 06/18/2005    Carpal tunnel syndrome 11/07/2008    Closed displaced transverse fracture of left patella 03/19/2025    s/p fall    Essential hypertension 03/30/2018    GERD (gastroesophageal reflux disease) 11/10/2017    Lumbar radiculopathy 12/16/2016    Osteoporosis 05/05/2022    Per dexa 5/2022      Restless leg syndrome 03/13/2012       Physical exam on discharge:   General:  Well-developed, not in apparent distress. Awake, alert, oriented ×3  Head: Atraumatic, normocephalic.   Eyes: PERRL.Clear conjunctivae. No jaundice  ENT:  External ears and nose without visible lesions or masses. Oral mucosa is moist.   Neck: No nodules noted, no asymmetry.  Pulmonary: Good respiratory effort, no use of accessory muscles. Good air entry bilaterally, no wheezing.   Cardiovascular: S1-S2 present, no murmur appreciated, regular rate, good radial pulses.   No lower extremities edema.   Abdomen: Soft, nontender, non distended. Bowel sounds present. No CVA tenderness  Musculoskeletal: left knee brace  Skin: Clean, dry. Open sore proximal left thigh, does not appear infected  Psychiatric/Mental Status: Normal affect, appropriate  Neurologic: CN II-XII grossly intact.       Follow-up on discharge:   PCP, neurology      Followup  Instructions:   Activity:  as tolerated  Diet:  Regular; Yes, Medical Nutrition Management by Rd (Registered Dietitian) Diet    Disposition:  home with OP PT    Code status: Full Resuscitation    Consults:   IP Consult Orders (From admission, onward)       Start     Ordered    07/19/25 1003  Inpatient consult to Neurology  ONE TIME        Provider:  Alexys Farfan MD    07/19/25 1003                      Significant Diagnostic Studies:     Glucose (mg/dL)   Date Value   07/20/2025 79   07/19/2025 97   07/18/2025 162 (H)       WBC (K/mcL)   Date Value   07/20/2025 7.5   07/19/2025 11.7 (H)   07/18/2025 9.8     HGB (g/dL)   Date Value   07/20/2025 12.0   07/19/2025 11.9 (L)   07/18/2025 11.5 (L)    BUN (mg/dL)   Date Value   07/20/2025 6   07/19/2025 <5 (L)   07/18/2025 <5 (L)      PLT (K/mcL)   Date Value   07/20/2025 445   07/19/2025 479 (H)   07/18/2025 470 (H)    Potassium (mmol/L)   Date Value   07/20/2025 4.5   07/19/2025 4.5   07/18/2025 3.2 (L)    Creatinine (mg/dL)   Date Value   07/20/2025 0.64   07/19/2025 0.60   07/18/2025 0.67      [unfilled]  INR (no units)   Date Value   03/19/2025 1.0       MRI BRAIN WO CONTRAST   Final Result   IMPRESSION:   *  No acute MRI abnormality, including no acute infarct or hemorrhage.   *  Age-related changes including chronic small vessel disease.   *  Left mastoid effusion or mucosal thickening.            Electronically Signed by: Kalyan Espino MD   Signed on: 7/20/2025 1:26 PM   Created on Workstation ID: [id number]   Signed on Workstation ID: [id number]      XR CHEST AP OR PA   Final Result   IMPRESSION:   1.  Mild prominence of the central pulmonary vasculature.   2.  Indistinct right suprahilar region may reflect prominent central   vasculature versus lymphadenopathy.            Electronically Signed by: Jazmín Bautista DO   Signed on: 7/18/2025 5:27 PM   Created on Workstation ID: [id number]   Signed on Workstation ID: [id number]      CT HEAD WO CONTRAST    Final Result   IMPRESSION:      1. No evidence for intracranial hemorrhage, mass effect, acute large vessel   territory infarct or acute intracranial process.                  Electronically Signed by: Roberto Ramirez MD   Signed on: 7/18/2025 5:34 PM   Created on Workstation ID: [id number]   Signed on Workstation ID: [id number]            Discharge Medications: Reviewed, please see Medication Reconciliation below.        Summary of your Discharge Medications        Take these Medications        Details   acetaminophen 325 MG tablet  Commonly known as: TYLENOL   Take 2 tablets by mouth every 6 hours.     * albuterol 108 (90 Base) MCG/ACT inhaler   Inhale 2 puffs into the lungs every 4 hours as needed for Shortness of Breath or Wheezing.     * albuterol (2.5 MG/3ML) 0.083% nebulizer solution  Commonly known as: VENTOLIN   Take 2.5 mg by nebulization every 6 hours as needed for Wheezing.     alendronate 70 MG tablet  Commonly known as: FOSAMAX   Take 70 mg by mouth every 7 days. Pt takes on Wednesday     busPIRone 10 MG tablet  Commonly known as: BUSPAR   Take 1 tablet by mouth in the morning and 1 tablet in the evening.     cefdinir 300 MG capsule  Commonly known as: OMNICEF   Take 1 capsule by mouth every 12 hours for 14 doses.     citalopram 10 MG tablet  Commonly known as: CeleXA   Take 10 mg by mouth in the morning and 10 mg in the evening.     FLUTICASONE-SALMETEROL IN   Inhale 1 puff into the lungs as needed.     ibuprofen 200 MG capsule   Take 400 mg by mouth every 6 hours as needed for Pain (mild to mod pain).     levETIRAcetam 500 MG tablet  Commonly known as: KepPRA   Take 1 tablet by mouth every 12 hours.     lisinopril 20 MG tablet  Commonly known as: ZESTRIL   Take 1 tablet by mouth daily.     montelukast 10 MG tablet  Commonly known as: SINGULAIR   Take 10 mg by mouth daily.     pramipexole 0.5 MG tablet  Commonly known as: MIRAPEX   Take 0.5 mg by mouth daily.     Senna-Plus 8.6-50 MG per tablet    Generic drug: docusate sodium-sennosides  Take 1 to 2 tablets by mouth daily to prevent constipation while taking narcotics     vitamin C 1000 MG tablet   Take 1,000 mg by mouth daily.     Vitamin D (Ergocalciferol) 50 mcg (2,000 units) capsule   Take 50 mcg by mouth daily.           * This list has 2 medication(s) that are the same as other medications prescribed for you. Read the directions carefully, and ask your doctor or other care provider to review them with you.                    Instructions were discussed with the patient/family at the time of discharge, all questions were answered.    Time spent on discharge was more than 35 minutes.      Signed:  Adam Davidson DO  Date of service: 7/21/2025         Single

## 2025-07-24 ENCOUNTER — RX RENEWAL (OUTPATIENT)
Age: 70
End: 2025-07-24

## 2025-08-27 ENCOUNTER — APPOINTMENT (OUTPATIENT)
Dept: ORTHOPEDIC SURGERY | Facility: CLINIC | Age: 70
End: 2025-08-27
Payer: MEDICARE

## 2025-08-27 VITALS — BODY MASS INDEX: 28.7 KG/M2 | WEIGHT: 185 LBS | HEIGHT: 67.5 IN

## 2025-08-27 DIAGNOSIS — M17.12 UNILATERAL PRIMARY OSTEOARTHRITIS, LEFT KNEE: ICD-10-CM

## 2025-08-27 PROCEDURE — J3490M: CUSTOM

## 2025-08-27 PROCEDURE — 73562 X-RAY EXAM OF KNEE 3: CPT | Mod: RT

## 2025-08-27 PROCEDURE — 99214 OFFICE O/P EST MOD 30 MIN: CPT | Mod: 25

## 2025-09-02 ENCOUNTER — RX RENEWAL (OUTPATIENT)
Age: 70
End: 2025-09-02

## (undated) DEVICE — MEDICATION LABELS W MARKER

## (undated) DEVICE — DRSG DERMABOND PRINEO 60CM

## (undated) DEVICE — DRSG STERISTRIPS 0.5 X 4"

## (undated) DEVICE — VENODYNE/SCD SLEEVE CALF MEDIUM

## (undated) DEVICE — DRAPE TOWEL BLUE 17" X 24"

## (undated) DEVICE — WARMING BLANKET LOWER ADULT

## (undated) DEVICE — DRAPE INSTRUMENT POUCH 6.75" X 11"

## (undated) DEVICE — SOL IRR POUR H2O 250ML

## (undated) DEVICE — SPECIMEN CONTAINER 100ML

## (undated) DEVICE — DRAPE THYROID 77" X 123"

## (undated) DEVICE — POSITIONER PATIENT SAFETY STRAP 3X60"

## (undated) DEVICE — SUT MONOCRYL 4-0 27" PS-2 UNDYED

## (undated) DEVICE — SOL IRR POUR NS 0.9% 500ML

## (undated) DEVICE — DRSG MASTISOL

## (undated) DEVICE — PACK MINOR